# Patient Record
Sex: FEMALE | Race: WHITE | Employment: FULL TIME | URBAN - METROPOLITAN AREA
[De-identification: names, ages, dates, MRNs, and addresses within clinical notes are randomized per-mention and may not be internally consistent; named-entity substitution may affect disease eponyms.]

---

## 2017-07-17 LAB — NORMAL RANGE: NORMAL

## 2018-07-17 ENCOUNTER — OFFICE VISIT (OUTPATIENT)
Dept: DERMATOLOGY | Facility: CLINIC | Age: 23
End: 2018-07-17
Payer: COMMERCIAL

## 2018-07-17 DIAGNOSIS — D22.9 MULTIPLE BENIGN NEVI: ICD-10-CM

## 2018-07-17 DIAGNOSIS — L70.0 ACNE VULGARIS: Primary | ICD-10-CM

## 2018-07-17 RX ORDER — INFLUENZA VIRUS VACCINE 15; 15; 15; 15 UG/.5ML; UG/.5ML; UG/.5ML; UG/.5ML
SUSPENSION INTRAMUSCULAR
Refills: 0 | COMMUNITY
Start: 2017-09-10 | End: 2018-10-17

## 2018-07-17 RX ORDER — METHYLPHENIDATE HYDROCHLORIDE 54 MG/1
54 TABLET ORAL EVERY MORNING
Refills: 0 | COMMUNITY
Start: 2017-12-08 | End: 2018-10-17

## 2018-07-17 RX ORDER — BUPROPION HYDROCHLORIDE 150 MG/1
TABLET ORAL
Refills: 0 | COMMUNITY
Start: 2018-05-11 | End: 2018-10-17

## 2018-07-17 RX ORDER — FLUOCINONIDE 0.5 MG/G
OINTMENT TOPICAL
Refills: 0 | COMMUNITY
Start: 2017-10-03 | End: 2018-10-17

## 2018-07-17 RX ORDER — DOXYCYCLINE 100 MG/1
100 CAPSULE ORAL DAILY
Qty: 90 CAPSULE | Refills: 0 | Status: SHIPPED | OUTPATIENT
Start: 2018-07-17 | End: 2018-10-17

## 2018-07-17 RX ORDER — ADAPALENE AND BENZOYL PEROXIDE GEL, 0.1%/2.5% 1; 25 MG/G; MG/G
GEL TOPICAL
Refills: 6 | COMMUNITY
Start: 2018-01-29 | End: 2018-09-08

## 2018-07-17 RX ORDER — NORELGESTROMIN AND ETHINYL ESTRADIOL 150; 35 UG/D; UG/D
PATCH TRANSDERMAL
Refills: 8 | COMMUNITY
Start: 2018-06-21 | End: 2019-04-18

## 2018-07-17 RX ORDER — CEPHALEXIN 500 MG/1
CAPSULE ORAL
Refills: 4 | COMMUNITY
Start: 2017-10-01 | End: 2018-10-17

## 2018-07-17 ASSESSMENT — PAIN SCALES - GENERAL: PAINLEVEL: NO PAIN (0)

## 2018-07-17 NOTE — LETTER
7/17/2018       RE: Hanna Berg  [address]  [address]  [facility] [address]     Dear Colleague,    Thank you for referring your patient, Hanna Berg, to the [facility] DERMATOLOGY at [facility]. Please see a copy of my visit note below.    [facility] Dermatology Note      Dermatology Problem List:  1.acne vulgaris - current: doxycycline 100mg po QD, epiduo   -Previous Tx: Bactrim, Accutane ~2012, various Rx topicals    Encounter Date: Jul 17, 2018    CC:  Chief Complaint   Patient presents with     Derm Problem     Hanna is here today to discuss acne treatments.      Skin Check     Hanna would also like a skin check- has one area of concern.          History of Present Illness:  Ms. Hanna Berg is a 23 year old female who presents for a skin check and a concern of acne. The patient is experiencing a good day, as in very few acne papules on the face. However, she continues to have moderate acne on her face since high school when she had a course of Accutane. She remembers that the Accutane made her very dry. Since the Accutane, she has been treated with doxy which worked very well or her. She is currently on Bactrim, but is not taking this medication regularly, she does not like the size of the pills. She says that antibiotics help with random flare-ups. She is also using Epiduo, Neutrogena facial cleanser at night and a sal acid wash in the morning. She is on birth control patch. She does not get periods because of this. She reports no acne with hormonal changes. She wants to try another antibiotic today. She is less inclined to start Accutane again due to the dryness, but understands she cannot stay on antibiotics for long periods of time. She has a hx of depression and suicidal ideation.      She says she has a mole of concern on her back. She burns easily but uses sunscreen regularly. No hx of skin cancer and no fhx of melanoma. The patient  denies painful, itching, tingling or bleeding lesions unless otherwise noted. She is otherwise doing well today.      Past Medical History:   Patient Active Problem List   Diagnosis     Suicidal ideation     Past Medical History:   Diagnosis Date     Depressive disorder      Scoliosis      Past Surgical History:   Procedure Laterality Date     HC TOOTH EXTRACTION W/FORCEP         Social History:  She uses sunscreen regularly.     Family History:  Brother has bad acne    Medications:  Current Outpatient Prescriptions   Medication Sig Dispense Refill     adapalene-benzoyl peroxide (EPIDUO) 0.1-2.5 % gel APPLY A PEA-SIZE AMOUNT TO THE FACE NIGHTLY AT BEDTIME AS TOLERATED.  6     buPROPion (WELLBUTRIN SR) 200 MG 12 hr tablet Take 1 tablet (200 mg) by mouth daily 60 tablet 0     buPROPion (WELLBUTRIN XL) 150 MG 24 hr tablet TAKE 1 TABLET BY MOUTH FOR 7 DAYS, THEN INCREASE TO 2 TABLETS DAILY IF WELL TOLERATED.  0     cephALEXin (KEFLEX) 500 MG capsule TAKE 1 CAPSULE BY MOUTH 2 TIMES DAILY WITH FOOD.  4     FLUARIX QUADRIVALENT 0.5 ML injection TO BE ADMINISTERED BY A PHARMACIST  0     fluocinonide (LIDEX) 0.05 % ointment APPLY TOPICALLY TO THE RASH ON EARS TWICE DAILY FOR UP TO 2 WEEKS  0     HYDROXYZINE HCL PO Take 25 mg by mouth At Bedtime       methylphenidate ER (CONCERTA) 54 MG CR tablet Take 54 mg by mouth every morning  0     norgestim-eth estrad triphasic (TRI-SPRINTEC) 0.18/0.215/0.25 MG-35 MCG TABS tablet Take 1 tablet by mouth daily       sertraline (ZOLOFT) 100 MG tablet Take 200 mg by mouth daily       Sulfamethoxazole-Trimethoprim (SULFAMETHOXAZOLE-TMP DS PO)        vortioxetine (TRINTELLIX) 20 MG tablet        XULANE 150-35 MCG/24HR patch APPLY 1 PATCH WEEKLY, USE CONTINUSOULY  8       No Known Allergies    Review of Systems:  -Constitutional: The patient is feeling generally well  -Skin: As above in HPI. No additional skin concerns.  -GI: no nausea, abdominal pain, vomiting, diarrhea or blood in the  stool    Physical exam:  Vitals: There were no vitals taken for this visit.  GEN: This is a well developed, well-nourished female in no acute distress, in a pleasant mood.    SKIN:  Total skin excluding the undergarment areas was performed. The exam included the head/face, neck, both arms, chest, back, abdomen, both legs, digits and/or nails.   -There are superifical acneiform papules with intermixed open and closed comedones on the face.   -Multiple regular brown pigmented macules and papules are identified on the back and ankle   -Santana's skin type I, approximately 100 nevi  -No other lesions of concern on areas examined.       Impression/Plan:  1. Acne vulgaris on the face. S/p accutane(isotretinoin) in high school     Start Doxycycline 100 mg QD for 3 months    Hold Bactrim     Continue using epiduo    Consider starting Accutane in the future.  Will need to consider hx of depression/suicidal ideation potentially    2. Multiple clinically benign nevi on the back and ankle    No further intervention required. Patient to report changes.     Sunscreen: Apply 20 minutes prior to going outdoors and reapply every two hours, when wet or sweating. We recommend using an SPF 30 or higher, and to use one that is water resistant.       Follow-up in 3 months, earlier for new or changing lesions.       Staff Involved:  Scribe/Staff      Scribe Disclosure:   I, Fredi Zambrano, am serving as a scribe to document services personally performed by ELDER Rain, based on data collection and the provider's statements to me.  Provider Disclosure:   The documentation recorded by the scribe accurately reflects the services I personally performed and the decisions made by me.    All risks, benefits and alternatives were discussed with patient.  Patient is in agreement and understands the assessment and plan.  All questions were answered.    Raine Rain PA-C  [facility]: Phone: [phone], Fax: [phone]                Again, thank you for allowing me to participate in the care of your patient.      Sincerely,    Raine Rain PA-C

## 2018-07-17 NOTE — LETTER
Date:July 19, 2018      Patient was self referred, no letter generated. Do not send.        [facility] Health Information

## 2018-07-17 NOTE — NURSING NOTE
Dermatology Rooming Note    Hanna Berg's goals for this visit include:   Chief Complaint   Patient presents with     Derm Problem     Hanna is here today to discuss acne treatments.      Skin Check     Hanna would also like a skin check- has one area of concern.      Zainab Dietrich MA

## 2018-07-17 NOTE — MR AVS SNAPSHOT
After Visit Summary   7/17/2018    Hanna Berg    MRN: [id number]           Patient Information     Date Of Birth          1995        Visit Information        Provider Department      7/17/2018 3:00 PM Raine Rain PA-C [facility] Dermatology        Today's Diagnoses     Acne vulgaris    -  1      Care Instructions    The ABCDEs of Melanoma    Skin cancer can develop anywhere on the skin. Ask someone for help when checking your skin, especially in hard to see places. If you notice a mole different from others, or that changes, enlarges, itches, or bleeds (even if it is small), you should see a dermatologist.                        Follow-ups after your visit        Who to contact     Please call your clinic at [phone] to:    Ask questions about your health    Make or cancel appointments    Discuss your medicines    Learn about your test results    Speak to your doctor            Additional Information About Your Visit        MyChart Information     [employer] gives you secure access to your electronic health record. If you see a primary care provider, you can also send messages to your care team and make appointments. If you have questions, please call your primary care clinic.  If you do not have a primary care provider, please call [phone] and they will assist you.      [employer] is an electronic gateway that provides easy, online access to your medical records. With [employer], you can request a clinic appointment, read your test results, renew a prescription or communicate with your care team.     To access your existing account, please contact your [facility] or call [phone] for assistance.        Care EveryWhere ID     This is your Care EveryWhere ID. This could be used by other organizations to access your [place] medical records  [id number]         Blood Pressure from Last 3 Encounters:   09/22/14 128/66    Weight from Last 3 Encounters:    09/23/14 76.7 kg (169 lb) (93 %)*     * Growth percentiles are based on [facility][note 3] 2-20 Years data.              Today, you had the following     No orders found for display         Today's Medication Changes          These changes are accurate as of 7/17/18  3:28 PM.  If you have any questions, ask your nurse or doctor.               Start taking these medicines.        Dose/Directions    doxycycline monohydrate 100 MG capsule   Used for:  Acne vulgaris   Started by:  Raine Rain PA-C        Dose:  100 mg   Take 1 capsule (100 mg) by mouth daily   Quantity:  90 capsule   Refills:  0            Where to get your medicines      These medications were sent to [facility] [id number] [facility] - [place], [place] - [address] [place] [address]  [address] [facility] [address]     Phone:  [phone]     doxycycline monohydrate 100 MG capsule                Primary Care Provider Fax #    Physician No Ref-Primary [phone]       No address on file        Equal Access to Services     [facility] : Hadii aad ku hadasho Soisrael, waaxda luqadaha, qaybta kaalmada adeegyada, robert king . So [facility] [phone].    ATENCIÓN: Si habla español, tiene a dailey disposición servicios gratuitos de asistencia lingüística. Andreas al [phone].    We comply with applicable federal civil rights laws and [place] laws. We do not discriminate on the basis of race, color, national origin, age, disability, sex, sexual orientation, or gender identity.            Thank you!     Thank you for choosing [facility] DERMATOLOGY  for your care. Our goal is always to provide you with excellent care. Hearing back from our patients is one way we can continue to improve our services. Please take a few minutes to complete the written survey that you may receive in the mail after your visit with us. Thank you!             Your Updated Medication List - Protect others around you: Learn how to safely use, store and throw away your  medicines at www.disposemymeds.org.          This list is accurate as of 7/17/18  3:28 PM.  Always use your most recent med list.                   Brand Name Dispense Instructions for use Diagnosis    adapalene-benzoyl peroxide 0.1-2.5 % gel    EPIDUO     APPLY A PEA-SIZE AMOUNT TO THE FACE NIGHTLY AT BEDTIME AS TOLERATED.        * buPROPion 200 MG 12 hr tablet    WELLBUTRIN SR    60 tablet    Take 1 tablet (200 mg) by mouth daily    Major depressive disorder       * buPROPion 150 MG 24 hr tablet    WELLBUTRIN XL     TAKE 1 TABLET BY MOUTH FOR 7 DAYS, THEN INCREASE TO 2 TABLETS DAILY IF WELL TOLERATED.        cephALEXin 500 MG capsule    KEFLEX     TAKE 1 CAPSULE BY MOUTH 2 TIMES DAILY WITH FOOD.        doxycycline monohydrate 100 MG capsule     90 capsule    Take 1 capsule (100 mg) by mouth daily    Acne vulgaris       FLUARIX QUADRIVALENT 0.5 ML injection   Generic drug:  influenza quadrivalent (PF) vacc age 3 yrs and older      TO BE ADMINISTERED BY A PHARMACIST        fluocinonide 0.05 % ointment    LIDEX     APPLY TOPICALLY TO THE RASH ON EARS TWICE DAILY FOR UP TO 2 WEEKS        HYDROXYZINE HCL PO      Take 25 mg by mouth At Bedtime        methylphenidate ER 54 MG CR tablet    CONCERTA     Take 54 mg by mouth every morning        sertraline 100 MG tablet    ZOLOFT     Take 200 mg by mouth daily        SULFAMETHOXAZOLE-TMP DS PO           TRI-SPRINTEC 0.18/0.215/0.25 MG-35 MCG per tablet   Generic drug:  norgestim-eth estrad triphasic      Take 1 tablet by mouth daily        TRINTELLIX 20 MG tablet   Generic drug:  vortioxetine           XULANE 150-35 MCG/24HR patch   Generic drug:  norelgestromin-ethinyl estradiol      APPLY 1 PATCH WEEKLY, USE CONTINUSOULY        * Notice:  This list has 2 medication(s) that are the same as other medications prescribed for you. Read the directions carefully, and ask your doctor or other care provider to review them with you.

## 2018-07-17 NOTE — PROGRESS NOTES
[facility] Dermatology Note      Dermatology Problem List:  1.acne vulgaris - current: doxycycline 100mg po QD, epiduo   -Previous Tx: Bactrim, Accutane ~2012, various Rx topicals    Encounter Date: Jul 17, 2018    CC:  Chief Complaint   Patient presents with     Derm Problem     Hanna is here today to discuss acne treatments.      Skin Check     Hanna would also like a skin check- has one area of concern.          History of Present Illness:  Ms. Hanna Berg is a 23 year old female who presents for a skin check and a concern of acne. The patient is experiencing a good day, as in very few acne papules on the face. However, she continues to have moderate acne on her face since high school when she had a course of Accutane. She remembers that the Accutane made her very dry. Since the Accutane, she has been treated with doxy which worked very well or her. She is currently on Bactrim, but is not taking this medication regularly, she does not like the size of the pills. She says that antibiotics help with random flare-ups. She is also using Epiduo, Neutrogena facial cleanser at night and a sal acid wash in the morning. She is on birth control patch. She does not get periods because of this. She reports no acne with hormonal changes. She wants to try another antibiotic today. She is less inclined to start Accutane again due to the dryness, but understands she cannot stay on antibiotics for long periods of time. She has a hx of depression and suicidal ideation.      She says she has a mole of concern on her back. She burns easily but uses sunscreen regularly. No hx of skin cancer and no fhx of melanoma. The patient denies painful, itching, tingling or bleeding lesions unless otherwise noted. She is otherwise doing well today.      Past Medical History:   Patient Active Problem List   Diagnosis     Suicidal ideation     Past Medical History:   Diagnosis Date     Depressive disorder      Scoliosis       Past Surgical History:   Procedure Laterality Date     HC TOOTH EXTRACTION W/FORCEP         Social History:  She uses sunscreen regularly.     Family History:  Brother has bad acne    Medications:  Current Outpatient Prescriptions   Medication Sig Dispense Refill     adapalene-benzoyl peroxide (EPIDUO) 0.1-2.5 % gel APPLY A PEA-SIZE AMOUNT TO THE FACE NIGHTLY AT BEDTIME AS TOLERATED.  6     buPROPion (WELLBUTRIN SR) 200 MG 12 hr tablet Take 1 tablet (200 mg) by mouth daily 60 tablet 0     buPROPion (WELLBUTRIN XL) 150 MG 24 hr tablet TAKE 1 TABLET BY MOUTH FOR 7 DAYS, THEN INCREASE TO 2 TABLETS DAILY IF WELL TOLERATED.  0     cephALEXin (KEFLEX) 500 MG capsule TAKE 1 CAPSULE BY MOUTH 2 TIMES DAILY WITH FOOD.  4     FLUARIX QUADRIVALENT 0.5 ML injection TO BE ADMINISTERED BY A PHARMACIST  0     fluocinonide (LIDEX) 0.05 % ointment APPLY TOPICALLY TO THE RASH ON EARS TWICE DAILY FOR UP TO 2 WEEKS  0     HYDROXYZINE HCL PO Take 25 mg by mouth At Bedtime       methylphenidate ER (CONCERTA) 54 MG CR tablet Take 54 mg by mouth every morning  0     norgestim-eth estrad triphasic (TRI-SPRINTEC) 0.18/0.215/0.25 MG-35 MCG TABS tablet Take 1 tablet by mouth daily       sertraline (ZOLOFT) 100 MG tablet Take 200 mg by mouth daily       Sulfamethoxazole-Trimethoprim (SULFAMETHOXAZOLE-TMP DS PO)        vortioxetine (TRINTELLIX) 20 MG tablet        XULANE 150-35 MCG/24HR patch APPLY 1 PATCH WEEKLY, USE CONTINUSOULY  8       No Known Allergies    Review of Systems:  -Constitutional: The patient is feeling generally well  -Skin: As above in HPI. No additional skin concerns.  -GI: no nausea, abdominal pain, vomiting, diarrhea or blood in the stool    Physical exam:  Vitals: There were no vitals taken for this visit.  GEN: This is a well developed, well-nourished female in no acute distress, in a pleasant mood.    SKIN:  Total skin excluding the undergarment areas was performed. The exam included the head/face, neck, both arms,  chest, back, abdomen, both legs, digits and/or nails.   -There are superifical acneiform papules with intermixed open and closed comedones on the face.   -Multiple regular brown pigmented macules and papules are identified on the back and ankle   -Santana's skin type I, approximately 100 nevi  -No other lesions of concern on areas examined.       Impression/Plan:  1. Acne vulgaris on the face. S/p accutane(isotretinoin) in high school     Start Doxycycline 100 mg QD for 3 months    Hold Bactrim     Continue using epiduo    Consider starting Accutane in the future.  Will need to consider hx of depression/suicidal ideation potentially    2. Multiple clinically benign nevi on the back and ankle    No further intervention required. Patient to report changes.     Sunscreen: Apply 20 minutes prior to going outdoors and reapply every two hours, when wet or sweating. We recommend using an SPF 30 or higher, and to use one that is water resistant.       Follow-up in 3 months, earlier for new or changing lesions.       Staff Involved:  Scribe/Staff      Scribe Disclosure:   I, Fredi Zambrano, am serving as a scribe to document services personally performed by ELDER Rain, based on data collection and the provider's statements to me.  Provider Disclosure:   The documentation recorded by the scribe accurately reflects the services I personally performed and the decisions made by me.    All risks, benefits and alternatives were discussed with patient.  Patient is in agreement and understands the assessment and plan.  All questions were answered.    Raine Rain PA-C  [facility]: Phone: [phone], Fax: [phone]

## 2018-07-24 ENCOUNTER — HEALTH MAINTENANCE LETTER (OUTPATIENT)
Age: 23
End: 2018-07-24

## 2018-09-08 DIAGNOSIS — L70.0 ACNE VULGARIS: Primary | ICD-10-CM

## 2018-09-08 RX ORDER — ADAPALENE AND BENZOYL PEROXIDE GEL, 0.1%/2.5% 1; 25 MG/G; MG/G
GEL TOPICAL
Qty: 45 G | Refills: 6 | Status: SHIPPED | OUTPATIENT
Start: 2018-09-08 | End: 2019-04-18

## 2018-10-17 ENCOUNTER — OFFICE VISIT (OUTPATIENT)
Dept: DERMATOLOGY | Facility: CLINIC | Age: 23
End: 2018-10-17
Payer: COMMERCIAL

## 2018-10-17 DIAGNOSIS — B07.0 PLANTAR WART, RIGHT FOOT: Primary | ICD-10-CM

## 2018-10-17 DIAGNOSIS — L70.0 ACNE VULGARIS: ICD-10-CM

## 2018-10-17 RX ORDER — DOXYCYCLINE 100 MG/1
100 CAPSULE ORAL DAILY
Qty: 90 CAPSULE | Refills: 0 | Status: SHIPPED | OUTPATIENT
Start: 2018-10-17 | End: 2019-01-02

## 2018-10-17 ASSESSMENT — PAIN SCALES - GENERAL: PAINLEVEL: NO PAIN (0)

## 2018-10-17 NOTE — NURSING NOTE
"[facility]:  PHQ-9 Screening Note  SITUATION/BACKGROUND                                                    Hanna Berg is a 23 year old female who completed the PHQ-9 assessment for depression and Score is >9.    Onset of symptoms: Consitent  Trigger: None  Recent related events: None  Prior history of suicide attempt or self harm: Yes  Risk Factors: previous suicide attempts  History of depression or mental illness: Yes  Medications reviewed: Yes     ASSESSMENT      A. Are any of the following present?      Suicidal thoughts with a plan and means to carry out the plan?    Intent to harm others    Altered mental status: confusion, delusional, psychotic NO - got to B   B. Are any of the following present?      Suicidal thoughts without a plan or means to carry out the plan    New onset of delusional ideas    Past inpatient admission for depression    New onset and recent change or addition of new medication NO - go to C   C. Are any of the following present?      Previous suicide attempts    Depression interfering with ability to work or function    Loss of appetite and eating poorly    Abrupt cessation of drugs (OTC or RX), alcohol or caffeine    Drug or alcohol abuse YES -  Provide patient with crisis resources.     Place referral to behavioral health team for \"regular\" follow-up   D. Are several of the following present?      Difficulty concentrating    Difficulty sleeping    Reduced interest in sexual activity or impotency    Irregular or absent menstruation    No interest in activity    Change in interpersonal relationships    Increased use/abuse of alcohol or drugs    Pregnant or recent child birth    Recent major life change    History of depression YES -  Follow-up with PCP for appointment and follow home care instructions.    Place referral to behavioral health team for \"regular\" follow-up.         PLAN      Home Care Instructions:   If currently in counseling, call counselor for " appointment    Report the following to your PCP:   Suicidal thoughts without a plan or means to carry out the plan    Seek emergency care immediately if any of the following occur:   Suicidal thoughts and plan and means to carry out the plan    BEHAVIORAL HEALTH TEAMS      [facility] - Behavioral Health Team    [facility] Pager: [phone]    [place]  - Behavioral Health Team    Pager number: [phone]    Referral to Behavioral Health   [facility]OW[facility] [04674}    RESOURCES      - 24/7 Crisis Hotlines: National Suicide Prevention Hotline  266-086-KPZQ (5504)    Vivi Lane, RN        Copyright 2016 [employer]

## 2018-10-17 NOTE — PROGRESS NOTES
"[facility] Dermatology Note      Dermatology Problem List:  1.acne vulgaris - current: doxycycline 100mg po QD, epiduo   -Previous Tx: Bactrim, Accutane ~2012, various Rx topicals  2. Verruca plantaris, plantar surface of right foot  - s/p paring & cryo- 10/17/18    Encounter Date: Oct 17, 2018    CC:  Chief Complaint   Patient presents with     Acne     Hanna is here for an acne follow up and notes \"steady and no flare ups\"     History of Present Illness:  Ms. Hanna Berg is a 23 year old female who presents today for an acne follow up. The patient was last seen in the dermatology clinic on 07/17/18 during which she started doxycycline 100 mg every day for 3 months regarding her acne vulgaris.     Today she reports that her skin is stable and under control. She has not had any flares since starting the doxycycline. She has been experiencing few acne bumps here and there but nothing major. She notes her acne spots are smaller and resolve quicker than her previous acne eruptions. She has been using the epiduo nightly without any excess dryness or irritation. She is happy with her current acne regimen. She would like to stay on the doxycycline, rather than going on Accutane again. When she took Accutane in high school she experienced multiple negative side effects and said it was very hard for her.     She also reports that she has warts on her feet. She has tried using OTC treatments without much improvement in the lesions. She is interested in clinical treatment. She has had the wart for a long time. She denies any additional warts on her other foot or hands.    She denies GI upset, nausea, stool changes, increase in yeast infections, photosensitivity. She has not noticed a change in her periods or change in her acne around her menstrual cycle. Otherwise the patient reports no additional painful, bleeding, nonhealing or pruritic lesions and denies any new or changing moles.    Past Medical " History:   Patient Active Problem List   Diagnosis     Suicidal ideation     Past Medical History:   Diagnosis Date     Depressive disorder      Scoliosis      Past Surgical History:   Procedure Laterality Date     HC TOOTH EXTRACTION W/FORCEP         Social History:  She uses sunscreen regularly.     Family History:  Brother has bad acne    Medications:  Current Outpatient Prescriptions   Medication Sig Dispense Refill     adapalene-benzoyl peroxide (EPIDUO) 0.1-2.5 % gel APPLY A PEA-SIZE AMOUNT TO THE FACE NIGHTLY AT BEDTIME AS TOLERATED. 45 g 6     doxycycline monohydrate 100 MG capsule Take 1 capsule (100 mg) by mouth daily 90 capsule 0     SELEGILINE HCL PO Take 30 mg by mouth daily       XULANE 150-35 MCG/24HR patch APPLY 1 PATCH WEEKLY, USE CONTINUSOULY  8     buPROPion (WELLBUTRIN SR) 200 MG 12 hr tablet Take 1 tablet (200 mg) by mouth daily (Patient not taking: Reported on 10/17/2018) 60 tablet 0     buPROPion (WELLBUTRIN XL) 150 MG 24 hr tablet TAKE 1 TABLET BY MOUTH FOR 7 DAYS, THEN INCREASE TO 2 TABLETS DAILY IF WELL TOLERATED.  0     cephALEXin (KEFLEX) 500 MG capsule TAKE 1 CAPSULE BY MOUTH 2 TIMES DAILY WITH FOOD.  4     FLUARIX QUADRIVALENT 0.5 ML injection TO BE ADMINISTERED BY A PHARMACIST  0     fluocinonide (LIDEX) 0.05 % ointment APPLY TOPICALLY TO THE RASH ON EARS TWICE DAILY FOR UP TO 2 WEEKS  0     HYDROXYZINE HCL PO Take 25 mg by mouth At Bedtime       methylphenidate ER (CONCERTA) 54 MG CR tablet Take 54 mg by mouth every morning  0     norgestim-eth estrad triphasic (TRI-SPRINTEC) 0.18/0.215/0.25 MG-35 MCG TABS tablet Take 1 tablet by mouth daily       sertraline (ZOLOFT) 100 MG tablet Take 200 mg by mouth daily       Sulfamethoxazole-Trimethoprim (SULFAMETHOXAZOLE-TMP DS PO)        vortioxetine (TRINTELLIX) 20 MG tablet          No Known Allergies    Review of Systems:  -Constitutional: The patient is feeling generally well  -Skin: As above in HPI. No additional skin concerns.  -GI: no  nausea, abdominal pain, vomiting, diarrhea or blood in the stool    Physical exam:  Vitals: There were no vitals taken for this visit.  GEN: This is a well developed, well-nourished female in no acute distress, in a pleasant mood.    SKIN:  A focused examination of the face and right foot were performed. Significant for:     -There are verrucous papules with thrombosed capillaries interrupting dermatoglyphics coalescing into a mosaic on the plantar surface of the right 4th metatarsal, as well as scattered to the surrounding plantar surface of the right foot.  -Rare comedone to the face.   -No other lesions of concern on areas examined.       Impression/Plan:  1. Acne vulgaris on the face, well controlled on current therapy. S/p accutane(isotretinoin) in high school     Continue Doxycycline 100 mg every day, attempt to taper at f/u.     Continue using epiduo nightly    Consider starting Spironolactone in the future. Discussed side effects and benefits as an alternative to an oral antibiotic.     2. Verruca plantaris    Discussed clinical treatment options such as: oral medications, cryotherapy and candida injections    Lesions pared with Dermablade piror to cryotherapy treatments  Cryotherapy procedure note: After verbal consent and discussion of risks and benefits including but no limited to dyspigmentation/scar, blister, and pain, 12 were treated with 1-2mm freeze border for 2 cycles with liquid nitrogen. Post cryotherapy instructions were provided.  Discussed consistent clinical treatment is key to complete removal of the lesion   Recommend paring the lesion down at home with a pumice stone after lesions have healed from today's cryotherapy  OK to continue to use OTC salicylic acid treatments after lesions have healed from today's cryotherapy.         Follow-up in 1 month, earlier for new or changing lesions.       Staff Involved:  Scribe/Staff    Scribe Disclosure:   Helen CORONADO, am serving as a scribe to  document services personally performed by Vidya De Luna PA-C, based on data collection and the provider's statements to me.    Provider Disclosure:   The documentation recorded by the scribe accurately reflects the services I personally performed and the decisions made by me.    All risks, benefits and alternatives were discussed with patient.  Patient is in agreement and understands the assessment and plan.  All questions were answered.  Sun Screen Education was given.   Return to Clinic in 1 month or sooner as needed.   Vidya De Luna PA-C   [facility]

## 2018-10-17 NOTE — LETTER
"10/17/2018       RE: Hanna Berg  [address]  [address]  [facility] [address]     Dear Colleague,    Thank you for referring your patient, Hanna Berg, to the [facility] DERMATOLOGY at [facility]. Please see a copy of my visit note below.    [facility] Dermatology Note      Dermatology Problem List:  1.acne vulgaris - current: doxycycline 100mg po QD, epiduo   -Previous Tx: Bactrim, Accutane ~2012, various Rx topicals  2. Verruca plantaris, plantar surface of right foot  - s/p paring & cryo- 10/17/18    Encounter Date: Oct 17, 2018    CC:  Chief Complaint   Patient presents with     Acne     Hanna is here for an acne follow up and notes \"steady and no flare ups\"     History of Present Illness:  Ms. Hanna Berg is a 23 year old female who presents today for an acne follow up. The patient was last seen in the dermatology clinic on 07/17/18 during which she started doxycycline 100 mg every day for 3 months regarding her acne vulgaris.     Today she reports that her skin is stable and under control. She has not had any flares since starting the doxycycline. She has been experiencing few acne bumps here and there but nothing major. She notes her acne spots are smaller and resolve quicker than her previous acne eruptions. She has been using the epiduo nightly without any excess dryness or irritation. She is happy with her current acne regimen. She would like to stay on the doxycycline, rather than going on Accutane again. When she took Accutane in high school she experienced multiple negative side effects and said it was very hard for her.     She also reports that she has warts on her feet. She has tried using OTC treatments without much improvement in the lesions. She is interested in clinical treatment. She has had the wart for a long time. She denies any additional warts on her other foot or hands.    She denies GI upset, nausea, stool changes, increase " in yeast infections, photosensitivity. She has not noticed a change in her periods or change in her acne around her menstrual cycle. Otherwise the patient reports no additional painful, bleeding, nonhealing or pruritic lesions and denies any new or changing moles.    Past Medical History:   Patient Active Problem List   Diagnosis     Suicidal ideation     Past Medical History:   Diagnosis Date     Depressive disorder      Scoliosis      Past Surgical History:   Procedure Laterality Date     HC TOOTH EXTRACTION W/FORCEP         Social History:  She uses sunscreen regularly.     Family History:  Brother has bad acne    Medications:  Current Outpatient Prescriptions   Medication Sig Dispense Refill     adapalene-benzoyl peroxide (EPIDUO) 0.1-2.5 % gel APPLY A PEA-SIZE AMOUNT TO THE FACE NIGHTLY AT BEDTIME AS TOLERATED. 45 g 6     doxycycline monohydrate 100 MG capsule Take 1 capsule (100 mg) by mouth daily 90 capsule 0     SELEGILINE HCL PO Take 30 mg by mouth daily       XULANE 150-35 MCG/24HR patch APPLY 1 PATCH WEEKLY, USE CONTINUSOULY  8     buPROPion (WELLBUTRIN SR) 200 MG 12 hr tablet Take 1 tablet (200 mg) by mouth daily (Patient not taking: Reported on 10/17/2018) 60 tablet 0     buPROPion (WELLBUTRIN XL) 150 MG 24 hr tablet TAKE 1 TABLET BY MOUTH FOR 7 DAYS, THEN INCREASE TO 2 TABLETS DAILY IF WELL TOLERATED.  0     cephALEXin (KEFLEX) 500 MG capsule TAKE 1 CAPSULE BY MOUTH 2 TIMES DAILY WITH FOOD.  4     FLUARIX QUADRIVALENT 0.5 ML injection TO BE ADMINISTERED BY A PHARMACIST  0     fluocinonide (LIDEX) 0.05 % ointment APPLY TOPICALLY TO THE RASH ON EARS TWICE DAILY FOR UP TO 2 WEEKS  0     HYDROXYZINE HCL PO Take 25 mg by mouth At Bedtime       methylphenidate ER (CONCERTA) 54 MG CR tablet Take 54 mg by mouth every morning  0     norgestim-eth estrad triphasic (TRI-SPRINTEC) 0.18/0.215/0.25 MG-35 MCG TABS tablet Take 1 tablet by mouth daily       sertraline (ZOLOFT) 100 MG tablet Take 200 mg by mouth daily        Sulfamethoxazole-Trimethoprim (SULFAMETHOXAZOLE-TMP DS PO)        vortioxetine (TRINTELLIX) 20 MG tablet          No Known Allergies    Review of Systems:  -Constitutional: The patient is feeling generally well  -Skin: As above in HPI. No additional skin concerns.  -GI: no nausea, abdominal pain, vomiting, diarrhea or blood in the stool    Physical exam:  Vitals: There were no vitals taken for this visit.  GEN: This is a well developed, well-nourished female in no acute distress, in a pleasant mood.    SKIN:  A focused examination of the face and right foot were performed. Significant for:     -There are verrucous papules with thrombosed capillaries interrupting dermatoglyphics coalescing into a mosaic on the plantar surface of the right 4th metatarsal, as well as scattered to the surrounding plantar surface of the right foot.  -Rare comedone to the face.   -No other lesions of concern on areas examined.       Impression/Plan:  1. Acne vulgaris on the face, well controlled on current therapy. S/p accutane(isotretinoin) in high school     Continue Doxycycline 100 mg every day, attempt to taper at f/u.     Continue using epiduo nightly    Consider starting Spironolactone in the future. Discussed side effects and benefits as an alternative to an oral antibiotic.     2. Verruca plantaris    Discussed clinical treatment options such as: oral medications, cryotherapy and candida injections    Lesions pared with Dermablade piror to cryotherapy treatments  Cryotherapy procedure note: After verbal consent and discussion of risks and benefits including but no limited to dyspigmentation/scar, blister, and pain, 12 were treated with 1-2mm freeze border for 2 cycles with liquid nitrogen. Post cryotherapy instructions were provided.  Discussed consistent clinical treatment is key to complete removal of the lesion   Recommend paring the lesion down at home with a pumice stone after lesions have healed from today's  cryotherapy  OK to continue to use OTC salicylic acid treatments after lesions have healed from today's cryotherapy.         Follow-up in 1 month, earlier for new or changing lesions.       Staff Involved:  Scribe/Staff    Scribe Disclosure:   I, Helen Hoff, am serving as a scribe to document services personally performed by Vidya De Luna PA-C, based on data collection and the provider's statements to me.    Provider Disclosure:   The documentation recorded by the scribe accurately reflects the services I personally performed and the decisions made by me.    All risks, benefits and alternatives were discussed with patient.  Patient is in agreement and understands the assessment and plan.  All questions were answered.  Sun Screen Education was given.   Return to Clinic in 1 month or sooner as needed.   Vidya De Luna PA-C   [facility] Dermat[facility]               Again, thank you for allowing me to participate in the care of your patient.      Sincerely,    Vidya De Luna PA-C

## 2018-10-17 NOTE — LETTER
Date:October 19, 2018      Patient was self referred, no letter generated. Do not send.        [facility] Information

## 2018-10-17 NOTE — NURSING NOTE
"Chief Complaint   Patient presents with     Acne     Hanna is here for an acne follow up and notes \"steady and no flare ups\"     Maximino Patton, LPN    "

## 2018-10-17 NOTE — PATIENT INSTRUCTIONS
Cryotherapy    What is it?    Use of a very cold liquid, such as liquid nitrogen, to freeze and destroy abnormal skin cells that need to be removed    What should I expect?    Tenderness and redness    A small blister that might grow and fill with dark purple blood. There may be crusting.    More than one treatment may be needed if the lesions do not go away.    How do I care for the treated area?    Gently wash the area with your hands when bathing.    Use a thin layer of Vaseline to help with healing. You may use a Band-Aid.     The area should heal within 7-10 days and may leave behind a pink or lighter color.     Do not use an antibiotic or Neosporin ointment.     You may take acetaminophen (Tylenol) for pain.     Call your Doctor if you have:    Severe pain    Signs of infection (warmth, redness, cloudy yellow drainage, and or a bad smell)    Questions or concerns    Who should I call with questions?       [facility]: [phone]       [facility]: [phone]       For urgent needs outside of business hours call the [facility] at [phone]        and ask for the dermatology resident on call

## 2018-10-17 NOTE — MR AVS SNAPSHOT
After Visit Summary   10/17/2018    Hanna Berg    MRN: [id number]           Patient Information     Date Of Birth          1995        Visit Information        Provider Department      10/17/2018 1:30 PM Vidya De Luna PA-C [facility] Dermatology        Today's Diagnoses     Acne vulgaris          Care Instructions    Cryotherapy    What is it?    Use of a very cold liquid, such as liquid nitrogen, to freeze and destroy abnormal skin cells that need to be removed    What should I expect?    Tenderness and redness    A small blister that might grow and fill with dark purple blood. There may be crusting.    More than one treatment may be needed if the lesions do not go away.    How do I care for the treated area?    Gently wash the area with your hands when bathing.    Use a thin layer of Vaseline to help with healing. You may use a Band-Aid.     The area should heal within 7-10 days and may leave behind a pink or lighter color.     Do not use an antibiotic or Neosporin ointment.     You may take acetaminophen (Tylenol) for pain.     Call your Doctor if you have:    Severe pain    Signs of infection (warmth, redness, cloudy yellow drainage, and or a bad smell)    Questions or concerns    Who should I call with questions?       [facility]: [phone]       [facility]: [phone]       For urgent needs outside of business hours call the [facility] at [phone]        and ask for the dermatology resident on call            Follow-ups after your visit        Who to contact     Please call your clinic at [phone] to:    Ask questions about your health    Make or cancel appointments    Discuss your medicines    Learn about your test results    Speak to your doctor            Additional Information About Your Visit        MyChart Information     [employer] gives you secure access to your electronic health record. If  you see a primary care provider, you can also send messages to your care team and make appointments. If you have questions, please call your primary care clinic.  If you do not have a primary care provider, please call [phone] and they will assist you.      [employer] is an electronic gateway that provides easy, online access to your medical records. With [employer], you can request a clinic appointment, read your test results, renew a prescription or communicate with your care team.     To access your existing account, please contact your [facility] or call [phone] for assistance.        Care EveryWhere ID     This is your Care EveryWhere ID. This could be used by other organizations to access your [place] medical records  [id number]         Blood Pressure from Last 3 Encounters:   09/22/14 128/66    Weight from Last 3 Encounters:   09/23/14 76.7 kg (169 lb) (93 %)*     * Growth percentiles are based on [facility] 2-20 Years data.              We Performed the Following     Pap Diagnostic Thin Prep          Where to get your medicines      These medications were sent to [place] [address] [place], [place] - [address]  [address] [address]     Phone:  [phone]     doxycycline monohydrate 100 MG capsule          Primary Care Provider Fax #    Physician No Ref-Primary [phone]       No address on file        Equal Access to Services     HENRY WHALEY : Byron jimenez Soisrael, waaxda luqadaha, qaybta kaalmada adeegyada, robert king . So [facility] [phone].    ATENCIÓN: Si habla español, tiene a dailey disposición servicios gratuitos de asistencia lingüística. Llame al [phone].    We comply with applicable federal civil rights laws and [place] laws. We do not discriminate on the basis of race, color, national origin, age, disability, sex, sexual orientation, or gender identity.            Thank you!      Thank you for choosing [facility] DERMATOLOGY  for your care. Our goal is always to provide you with excellent care. Hearing back from our patients is one way we can continue to improve our services. Please take a few minutes to complete the written survey that you may receive in the mail after your visit with us. Thank you!             Your Updated Medication List - Protect others around you: Learn how to safely use, store and throw away your medicines at www.disposemymeds.org.          This list is accurate as of 10/17/18  2:23 PM.  Always use your most recent med list.                   Brand Name Dispense Instructions for use Diagnosis    adapalene-benzoyl peroxide 0.1-2.5 % gel    EPIDUO    45 g    APPLY A PEA-SIZE AMOUNT TO THE FACE NIGHTLY AT BEDTIME AS TOLERATED.    Acne vulgaris       doxycycline monohydrate 100 MG capsule     90 capsule    Take 1 capsule (100 mg) by mouth daily    Acne vulgaris       SELEGILINE HCL PO      Take 30 mg by mouth daily        XULANE 150-35 MCG/24HR patch   Generic drug:  norelgestromin-ethinyl estradiol      APPLY 1 PATCH WEEKLY, USE CONTINUSOULY

## 2018-10-17 NOTE — NURSING NOTE
Patient denied referral from [facility] as she is under the supervision of a phycologist and sees a therapist. She has an upcoming appointments with the therapist and phycologist.     Vivi Lane RN

## 2018-10-18 ASSESSMENT — PATIENT HEALTH QUESTIONNAIRE - PHQ9: SUM OF ALL RESPONSES TO PHQ QUESTIONS 1-9: 21

## 2018-11-14 ENCOUNTER — OFFICE VISIT (OUTPATIENT)
Dept: DERMATOLOGY | Facility: CLINIC | Age: 23
End: 2018-11-14
Payer: COMMERCIAL

## 2018-11-14 DIAGNOSIS — B07.0 PLANTAR WART, RIGHT FOOT: Primary | ICD-10-CM

## 2018-11-14 ASSESSMENT — PAIN SCALES - GENERAL: PAINLEVEL: NO PAIN (0)

## 2018-11-14 NOTE — PROGRESS NOTES
[facility] Dermatology Note      Dermatology Problem List:  1.acne vulgaris - current: doxycycline 100mg po QD, epiduo   -Previous Tx: Bactrim, Accutane ~, various Rx topicals  2. Verruca plantaris, plantar surface of right foot  - s/p paring & cryo- 10/17/18, 2018    Encounter Date: 2018    CC:  Chief Complaint   Patient presents with     Derm Problem     Hanna is here today for a wart follow up- notes improvment.          History of Present Illness:  Ms. Hanna Berg is a 23 year old female who presents as a follow-up for wart. The patient was last seen 10/17/2018 when cryo was administered. At today's visit the patient states that her acne is much better and her wart has improved. She states that she has been using the Compound W at home. The patient denies painful, itching, tingling or bleeding lesions unless otherwise noted.      Past Medical History:   Patient Active Problem List   Diagnosis     Suicidal ideation     Past Medical History:   Diagnosis Date     Depressive disorder      Scoliosis      Past Surgical History:   Procedure Laterality Date     HC TOOTH EXTRACTION W/FORCEP         Social History:   reports that she has never smoked. She has never used smokeless tobacco. She reports that she does not drink alcohol or use illicit drugs.    Family History:  Family History   Problem Relation Age of Onset     Endocrine Disease Mother      hypothyroidism     C.A.D. Paternal Grandfather       MI     Prostate Cancer Maternal Grandfather      Depression Maternal Grandmother      Lipids Maternal Grandmother      Melanoma No family hx of      Skin Cancer No family hx of        Medications:  Current Outpatient Prescriptions   Medication Sig Dispense Refill     adapalene-benzoyl peroxide (EPIDUO) 0.1-2.5 % gel APPLY A PEA-SIZE AMOUNT TO THE FACE NIGHTLY AT BEDTIME AS TOLERATED. 45 g 6     doxycycline monohydrate 100 MG capsule Take 1 capsule (100 mg) by mouth daily 90  capsule 0     SELEGILINE HCL PO Take 30 mg by mouth daily       XULANE 150-35 MCG/24HR patch APPLY 1 PATCH WEEKLY, USE CONTINUSOULY  8       No Known Allergies    Review of Systems:  -Constitutional: The patient denies fatigue, fevers, chills, unintended weight loss, and night sweats.  -Skin: As above in HPI. No additional skin concerns.    Physical exam:  Vitals: There were no vitals taken for this visit.  GEN: This is a well developed, well-nourished female in no acute distress, in a pleasant mood.    SKIN: Focused examination of the face and left foot was performed.  -There are 8 verrucous papules with thrombosed capillaries interrupting dermatoglyphics on the right planter wart, some of them near the 4th metatarsal that are in a mosaic pattern.    -No other lesions of concern on areas examined.       Impression/Plan:  1. Acne vulgaris on the face, well controlled on current therap. S/p accutane (isotretinoin) in high school    Continue Doxycycline 100 mg every day, attempt to taper at f/u.     Continue using epiduo nightly    Consider starting Spironolactone in the future. Discussed side effects and benefits as an alternative to an oral antibiotic.     2. Verruca plantaris and Verruca vulgaris    Cryotherapy procedure note: After verbal consent and discussion of risks and benefits including but no limited to dyspigmentation/scar, blister, and pain, 8 was(were) treated with 1-2mm freeze border for 2 cycles with liquid nitrogen. Post cryotherapy instructions were provided.     Continue to use OTC salicylic acid treatments after lesions have healed from today's cryotherapy.    We will clinically monitor this area.    CC Dr. Jones on close of this encounter.  Follow-up in 3 weeks, earlier for new or changing lesions.       Staff Involved:    Scribe Disclosure  I, Jeremias Hartman, am serving as a scribe to document services personally performed by Raine Rain PA-C, based on data collection and the provider's  statements to me.     Provider Disclosure:   The documentation recorded by the scribe accurately reflects the services I personally performed and the decisions made by me.    All risks, benefits and alternatives were discussed with patient.  Patient is in agreement and understands the assessment and plan.  All questions were answered.    Raine Rain PA-C  [facility]: Phone: [phone], Fax: [phone]

## 2018-11-14 NOTE — LETTER
Date:November 15, 2018      Patient was self referred, no letter generated. Do not send.        [facility] Health Information

## 2018-11-14 NOTE — MR AVS SNAPSHOT
After Visit Summary   11/14/2018    Hanna Berg    MRN: [id number]           Patient Information     Date Of Birth          1995        Visit Information        Provider Department      11/14/2018 1:30 PM Raine Rain PA-C M [facility] Dermatology        Today's Diagnoses     Plantar wart, right foot    -  1       Follow-ups after your visit        Follow-up notes from your care team     Return in about 3 weeks (around 12/5/2018).      Your next 10 appointments already scheduled     Dec 05, 2018  2:00 PM CST   (Arrive by 1:45 PM)   Return Visit with JEANIE Lindquist [facility] Dermatology ([facility] [place])    [address] [address] [phone]              Who to contact     Please call your clinic at [phone] to:    Ask questions about your health    Make or cancel appointments    Discuss your medicines    Learn about your test results    Speak to your doctor            Additional Information About Your Visit        [employer] Information     [employer] gives you secure access to your electronic health record. If you see a primary care provider, you can also send messages to your care team and make appointments. If you have questions, please call your primary care clinic.  If you do not have a primary care provider, please call [phone] and they will assist you.      [employer] is an electronic gateway that provides easy, online access to your medical records. With [employer], you can request a clinic appointment, read your test results, renew a prescription or communicate with your care team.     To access your existing account, please contact your [facility] or call [phone] for assistance.        Care EveryWhere ID     This is your Care EveryWhere ID. This could be used by other organizations to access your [place] medical records  [id number]         Blood Pressure from Last 3 Encounters:    09/22/14 128/66    Weight from Last 3 Encounters:   09/23/14 76.7 kg (169 lb) (93 %)*     * Growth percentiles are based on CDC 2-20 Years data.              We Performed the Following     DESTRUCT BENIGN LESION, UP TO 14        Primary Care Provider Fax #    Physician No Ref-Primary [phone]       No address on file        Equal Access to Services     [facility]: Hadii aad ku hadasho Soomaali, waaxda luqadaha, qaybta kaalmada adeegyada, robert soodin amadan adetemo massey lalisaangeli . So [facility] [phone].    ATENCIÓN: Si habla español, tiene a dailey disposición servicios gratuitos de asistencia lingüística. Llame al [phone].    We comply with applicable federal civil rights laws and [place] laws. We do not discriminate on the basis of race, color, national origin, age, disability, sex, sexual orientation, or gender identity.            Thank you!     Thank you for choosing [facility] DERMATOLOGY  for your care. Our goal is always to provide you with excellent care. Hearing back from our patients is one way we can continue to improve our services. Please take a few minutes to complete the written survey that you may receive in the mail after your visit with us. Thank you!             Your Updated Medication List - Protect others around you: Learn how to safely use, store and throw away your medicines at www.disposemymeds.org.          This list is accurate as of 11/14/18  4:31 PM.  Always use your most recent med list.                   Brand Name Dispense Instructions for use Diagnosis    adapalene-benzoyl peroxide 0.1-2.5 % gel    EPIDUO    45 g    APPLY A PEA-SIZE AMOUNT TO THE FACE NIGHTLY AT BEDTIME AS TOLERATED.    Acne vulgaris       doxycycline monohydrate 100 MG capsule     90 capsule    Take 1 capsule (100 mg) by mouth daily    Acne vulgaris       SELEGILINE HCL PO      Take 30 mg by mouth daily        XULANE 150-35 MCG/24HR patch   Generic drug:  norelgestromin-ethinyl estradiol      APPLY 1 PATCH  WEEKLY, USE CONTINUSOULY

## 2018-11-14 NOTE — NURSING NOTE
Dermatology Rooming Note    Hanna Berg's goals for this visit include:   Chief Complaint   Patient presents with     Derm Problem     Hanna is here today for a wart follow up- notes improvment.      MARY ANNE Elizondo      Airway patent, Nasal mucosa clear. Mouth with normal mucosa. Throat has no vesicles, no oropharyngeal exudates and uvula is midline.

## 2018-11-14 NOTE — LETTER
2018       RE: Hanna Berg  [address]  [address]  [facility] [facility] [place] [address]     Dear Colleague,    Thank you for referring your patient, Hanna Berg, to the [facility] DERMATOLOGY [facility]. Please see a copy of my visit note below.    [facility] Dermatology Note      Dermatology Problem List:  1.acne vulgaris - current: doxycycline 100mg po QD, epiduo   -Previous Tx: Bactrim, Accutane ~, various Rx topicals  2. Verruca plantaris, plantar surface of right foot  - s/p paring & cryo- 10/17/18, 2018    Encounter Date: 2018    CC:  Chief Complaint   Patient presents with     Derm Problem     Hanna is here today for a wart follow up- notes improvment.          History of Present Illness:  Ms. Hanna Berg is a 23 year old female who presents as a follow-up for wart. The patient was last seen 10/17/2018 when cryo was administered. At today's visit the patient states that her acne is much better and her wart has improved. She states that she has been using the Compound W at home. The patient denies painful, itching, tingling or bleeding lesions unless otherwise noted.      Past Medical History:   Patient Active Problem List   Diagnosis     Suicidal ideation     Past Medical History:   Diagnosis Date     Depressive disorder      Scoliosis      Past Surgical History:   Procedure Laterality Date     HC TOOTH EXTRACTION W/FORCEP         Social History:   reports that she has never smoked. She has never used smokeless tobacco. She reports that she does not drink alcohol or use illicit drugs.    Family History:  Family History   Problem Relation Age of Onset     Endocrine Disease Mother      hypothyroidism     C.A.D. Paternal Grandfather       MI     Prostate Cancer Maternal Grandfather      Depression Maternal Grandmother      Lipids Maternal Grandmother      Melanoma No family hx of      Skin Cancer No family hx of         Medications:  Current Outpatient Prescriptions   Medication Sig Dispense Refill     adapalene-benzoyl peroxide (EPIDUO) 0.1-2.5 % gel APPLY A PEA-SIZE AMOUNT TO THE FACE NIGHTLY AT BEDTIME AS TOLERATED. 45 g 6     doxycycline monohydrate 100 MG capsule Take 1 capsule (100 mg) by mouth daily 90 capsule 0     SELEGILINE HCL PO Take 30 mg by mouth daily       XULANE 150-35 MCG/24HR patch APPLY 1 PATCH WEEKLY, USE CONTINUSOULY  8       No Known Allergies    Review of Systems:  -Constitutional: The patient denies fatigue, fevers, chills, unintended weight loss, and night sweats.  -Skin: As above in HPI. No additional skin concerns.    Physical exam:  Vitals: There were no vitals taken for this visit.  GEN: This is a well developed, well-nourished female in no acute distress, in a pleasant mood.    SKIN: Focused examination of the face and left foot was performed.  -There are 8 verrucous papules with thrombosed capillaries interrupting dermatoglyphics on the right planter wart, some of them near the 4th metatarsal that are in a mosaic pattern.    -No other lesions of concern on areas examined.       Impression/Plan:  1. Acne vulgaris on the face, well controlled on current therap. S/p accutane (isotretinoin) in high school    Continue Doxycycline 100 mg every day, attempt to taper at f/u.     Continue using epiduo nightly    Consider starting Spironolactone in the future. Discussed side effects and benefits as an alternative to an oral antibiotic.     2. Verruca plantaris and Verruca vulgaris    Cryotherapy procedure note: After verbal consent and discussion of risks and benefits including but no limited to dyspigmentation/scar, blister, and pain, 8 was(were) treated with 1-2mm freeze border for 2 cycles with liquid nitrogen. Post cryotherapy instructions were provided.     Continue to use OTC salicylic acid treatments after lesions have healed from today's cryotherapy.    We will clinically monitor this area.    CC  Dr. Jones on close of this encounter.  Follow-up in 3 weeks, earlier for new or changing lesions.       Staff Involved:    Scribe Disclosure  I, Jeremias Hartman, am serving as a scribe to document services personally performed by Raine Rain PA-C, based on data collection and the provider's statements to me.     Provider Disclosure:   The documentation recorded by the scribe accurately reflects the services I personally performed and the decisions made by me.    All risks, benefits and alternatives were discussed with patient.  Patient is in agreement and understands the assessment and plan.  All questions were answered.    Raine Rain PA-C  [facility]: Phone: [phone], Fax: [phone]                    Again, thank you for allowing me to participate in the care of your patient.      Sincerely,    Raine Rain PA-C

## 2018-12-05 ENCOUNTER — OFFICE VISIT (OUTPATIENT)
Dept: DERMATOLOGY | Facility: CLINIC | Age: 23
End: 2018-12-05
Payer: COMMERCIAL

## 2018-12-05 DIAGNOSIS — L85.3 XEROSIS OF SKIN: ICD-10-CM

## 2018-12-05 DIAGNOSIS — L84 CALLUS OF HEEL: ICD-10-CM

## 2018-12-05 DIAGNOSIS — B07.0 PLANTAR WART, RIGHT FOOT: Primary | ICD-10-CM

## 2018-12-05 DIAGNOSIS — B07.0 PLANTAR WART: Primary | ICD-10-CM

## 2018-12-05 RX ORDER — AMMONIUM LACTATE 12 G/100G
CREAM TOPICAL 2 TIMES DAILY PRN
Qty: 385 G | Refills: 3 | Status: SHIPPED | OUTPATIENT
Start: 2018-12-05

## 2018-12-05 ASSESSMENT — PAIN SCALES - GENERAL: PAINLEVEL: NO PAIN (0)

## 2018-12-05 NOTE — MR AVS SNAPSHOT
After Visit Summary   12/5/2018    Hanna Berg    MRN: [id number]           Patient Information     Date Of Birth          1995        Visit Information        Provider Department      12/5/2018 2:00 PM Raine Rain PA-C M [facility] Dermatology        Today's Diagnoses     Plantar wart, right foot    -  1    Callus of heel           Follow-ups after your visit        Follow-up notes from your care team     Return in about 2 weeks (around 12/19/2018).      Your next 10 appointments already scheduled     Dec 19, 2018  1:45 PM CST   (Arrive by 1:30 PM)   Return Visit with JEANIE Lindquist [facility] Dermatology ([facility] [place])    [address]  [address] [facility] [address] [phone]              Who to contact     Please call your clinic at [phone] to:    Ask questions about your health    Make or cancel appointments    Discuss your medicines    Learn about your test results    Speak to your doctor            Additional Information About Your Visit        [employer] Information     [employer] gives you secure access to your electronic health record. If you see a primary care provider, you can also send messages to your care team and make appointments. If you have questions, please call your primary care clinic.  If you do not have a primary care provider, please call [phone] and they will assist you.      [employer] is an electronic gateway that provides easy, online access to your medical records. With [employer], you can request a clinic appointment, read your test results, renew a prescription or communicate with your care team.     To access your existing account, please contact your [facility] or call [phone] for assistance.        Care EveryWhere ID     This is your Care EveryWhere ID. This could be used by other organizations to access your [facility] medical records  [id number]         Blood Pressure from  Last 3 Encounters:   09/22/14 128/66    Weight from Last 3 Encounters:   09/23/14 76.7 kg (169 lb) (93 %)*     * Growth percentiles are based on CDC 2-20 Years data.              We Performed the Following     DESTRUCT BENIGN LESION, UP TO 14          Today's Medication Changes          These changes are accurate as of 12/5/18  3:03 PM.  If you have any questions, ask your nurse or doctor.               Start taking these medicines.        Dose/Directions    ammonium lactate 12 % external cream   Commonly known as:  LAC-HYDRIN   Used for:  Plantar wart, Xerosis of skin   Started by:  Raine Rain PA-C        Apply topically 2 times daily as needed for dry skin   Quantity:  385 g   Refills:  3            Where to get your medicines      These medications were sent to [facility] [id number] [place], [place] - [address] [place] [address]  [address] [place] [address]     Phone:  [phone]     ammonium lactate 12 % external cream                Primary Care Provider Fax #    Physician No Ref-Primary [phone]       No address on file        Equal Access to Services     PARMINDER WHALEY : Hadsheryl evanso Soomaali, waaxda luqadaha, qaybta kaalmada adetemoyaaracelis, robert king . So [facility] [phone].    ATENCIÓN: Si habla español, tiene a dailey disposición servicios gratuitos de asistencia lingüística. Llame al [phone].    We comply with applicable federal civil rights laws and [place] laws. We do not discriminate on the basis of race, color, national origin, age, disability, sex, sexual orientation, or gender identity.            Thank you!     Thank you for choosing [facility] DERMATOLOGY  for your care. Our goal is always to provide you with excellent care. Hearing back from our patients is one way we can continue to improve our services. Please take a few minutes to complete the written survey that you may receive in the mail after your visit with us. Thank you!              Your Updated Medication List - Protect others around you: Learn how to safely use, store and throw away your medicines at www.disposemymeds.org.          This list is accurate as of 12/5/18  3:03 PM.  Always use your most recent med list.                   Brand Name Dispense Instructions for use Diagnosis    adapalene-benzoyl peroxide 0.1-2.5 % gel    EPIDUO    45 g    APPLY A PEA-SIZE AMOUNT TO THE FACE NIGHTLY AT BEDTIME AS TOLERATED.    Acne vulgaris       ammonium lactate 12 % external cream    LAC-HYDRIN    385 g    Apply topically 2 times daily as needed for dry skin    Plantar wart, Xerosis of skin       doxycycline monohydrate 100 MG capsule    MONODOX    90 capsule    Take 1 capsule (100 mg) by mouth daily    Acne vulgaris       SELEGILINE HCL PO      Take 30 mg by mouth daily        XULANE 150-35 MCG/24HR patch   Generic drug:  norelgestromin-ethinyl estradiol      APPLY 1 PATCH WEEKLY, USE CONTINUSOULY

## 2018-12-05 NOTE — LETTER
2018       RE: Hanna Berg  [address]  [address]  [place] [address]     Dear Colleague,    Thank you for referring your patient, Hanna Berg, to the [facility] DERMATOLOGY at [facility]. Please see a copy of my visit note below.    [facility] Dermatology Note      Dermatology Problem List:  1.acne vulgaris - current: doxycycline 100mg po QD, epiduo   -Previous Tx: Bactrim, Accutane ~, various Rx topicals  2. Verruca plantaris, plantar surface of right foot  - s/p paring & cryo- 10/17/18, 2018, cantharone 18  3. Heel callus - Lac-Hydrin    Encounter Date: Dec 5, 2018    CC:  Chief Complaint   Patient presents with     Derm Problem     Hanna is here today for a wart follow up- notes no change.          History of Present Illness:  Ms. Hanna Berg is a 23 year old female who presents as a follow-up for wart. The patient was last seen 18 when cryo was administered. At today's visit the patient states that her warts have improved.  Patient also points out that she has dry and cracking skin on the bottom of her feet. The patient denies painful, itching, tingling or bleeding lesions unless otherwise noted.      Past Medical History:   Patient Active Problem List   Diagnosis     Suicidal ideation     Past Medical History:   Diagnosis Date     Depressive disorder      Scoliosis      Past Surgical History:   Procedure Laterality Date     HC TOOTH EXTRACTION W/FORCEP         Social History:   reports that she has never smoked. She has never used smokeless tobacco. She reports that she does not drink alcohol or use illicit drugs.    Family History:  Family History   Problem Relation Age of Onset     Endocrine Disease Mother      hypothyroidism     C.A.D. Paternal Grandfather       MI     Prostate Cancer Maternal Grandfather      Depression Maternal Grandmother      Lipids Maternal Grandmother      Melanoma No family hx of       Skin Cancer No family hx of        Medications:  Current Outpatient Prescriptions   Medication Sig Dispense Refill     adapalene-benzoyl peroxide (EPIDUO) 0.1-2.5 % gel APPLY A PEA-SIZE AMOUNT TO THE FACE NIGHTLY AT BEDTIME AS TOLERATED. 45 g 6     doxycycline monohydrate 100 MG capsule Take 1 capsule (100 mg) by mouth daily 90 capsule 0     SELEGILINE HCL PO Take 30 mg by mouth daily       XULANE 150-35 MCG/24HR patch APPLY 1 PATCH WEEKLY, USE CONTINUSOULY  8       No Known Allergies    Review of Systems:  -Constitutional: The patient denies fatigue, fevers, chills, unintended weight loss, and night sweats.  -Skin: As above in HPI. No additional skin concerns.    Physical exam:  Vitals: There were no vitals taken for this visit.  GEN: This is a well developed, well-nourished female in no acute distress, in a pleasant mood.    SKIN: Focused examination of the face and left foot was performed.  -There are 8 verrucous papules with thrombosed capillaries interrupting dermatoglyphics on the right planter wart, some of them near the 4th metatarsal that are in a mosaic pattern.    -No other lesions of concern on areas examined.       Impression/Plan:  1. Dry callused heels    Start Lac-Hydrin BID - this will also help the warts    2. Verruca plantaris     Site(s) was pared prior to procedure    Cantherone was applied to right planter wart.  Instructions were given to wash with soap and water in 3-4 hours. Counseled not to place treated lesions in mouth. Counseled that area will become irritated and likely blister. Post care instructions provided in written and verbal format.     Cryotherapy procedure note: After verbal consent and discussion of risks and benefits including but no limited to dyspigmentation/scar, blister, and pain, 8 was(were) treated with 1-2mm freeze border for 2 cycles with liquid nitrogen. Post cryotherapy instructions were provided.     Continue to use OTC salicylic acid treatments after lesions have  healed from today's cryotherapy.    Start ammonium lactate 12% external cream, apply topically 2 times daily for dry skin     We will clinically monitor this area.    CC Dr. Jones on close of this encounter.  Follow-up in 2 weeks, earlier for new or changing lesions.       Staff Involved:    Scribe Disclosure  I, Jeremias Devan, am serving as a scribe to document services personally performed by Raine Rain PA-C, based on data collection and the provider's statements to me.     Provider Disclosure:   The documentation recorded by the scribe accurately reflects the services I personally performed and the decisions made by me.    All risks, benefits and alternatives were discussed with patient.  Patient is in agreement and understands the assessment and plan.  All questions were answered.    Raine Rain PA-C  [facility]: Phone: [phone], Fax: [phone]                              Again, thank you for allowing me to participate in the care of your patient.      Sincerely,    Raine Rain PA-C

## 2018-12-05 NOTE — NURSING NOTE
Dermatology Rooming Note    Hanna Berg's goals for this visit include:   Chief Complaint   Patient presents with     Derm Problem     Hanna is here today for a wart follow up- notes no change.      MARY ANNE Elizondo

## 2018-12-05 NOTE — PROGRESS NOTES
[facility] Dermatology Note      Dermatology Problem List:  1.acne vulgaris - current: doxycycline 100mg po QD, epiduo   -Previous Tx: Bactrim, Accutane ~, various Rx topicals  2. Verruca plantaris, plantar surface of right foot  - s/p paring & cryo- 10/17/18, 2018, cantharone 18  3. Heel callus - Lac-Hydrin    Encounter Date: Dec 5, 2018    CC:  Chief Complaint   Patient presents with     Derm Problem     Hanna is here today for a wart follow up- notes no change.          History of Present Illness:  Ms. Hanna Berg is a 23 year old female who presents as a follow-up for wart. The patient was last seen 18 when cryo was administered. At today's visit the patient states that her warts have improved.  Patient also points out that she has dry and cracking skin on the bottom of her feet. The patient denies painful, itching, tingling or bleeding lesions unless otherwise noted.      Past Medical History:   Patient Active Problem List   Diagnosis     Suicidal ideation     Past Medical History:   Diagnosis Date     Depressive disorder      Scoliosis      Past Surgical History:   Procedure Laterality Date     HC TOOTH EXTRACTION W/FORCEP         Social History:   reports that she has never smoked. She has never used smokeless tobacco. She reports that she does not drink alcohol or use illicit drugs.    Family History:  Family History   Problem Relation Age of Onset     Endocrine Disease Mother      hypothyroidism     C.A.D. Paternal Grandfather       MI     Prostate Cancer Maternal Grandfather      Depression Maternal Grandmother      Lipids Maternal Grandmother      Melanoma No family hx of      Skin Cancer No family hx of        Medications:  Current Outpatient Prescriptions   Medication Sig Dispense Refill     adapalene-benzoyl peroxide (EPIDUO) 0.1-2.5 % gel APPLY A PEA-SIZE AMOUNT TO THE FACE NIGHTLY AT BEDTIME AS TOLERATED. 45 g 6     doxycycline monohydrate 100 MG  capsule Take 1 capsule (100 mg) by mouth daily 90 capsule 0     SELEGILINE HCL PO Take 30 mg by mouth daily       XULANE 150-35 MCG/24HR patch APPLY 1 PATCH WEEKLY, USE CONTINUSOULY  8       No Known Allergies    Review of Systems:  -Constitutional: The patient denies fatigue, fevers, chills, unintended weight loss, and night sweats.  -Skin: As above in HPI. No additional skin concerns.    Physical exam:  Vitals: There were no vitals taken for this visit.  GEN: This is a well developed, well-nourished female in no acute distress, in a pleasant mood.    SKIN: Focused examination of the face and left foot was performed.  -There are 8 verrucous papules with thrombosed capillaries interrupting dermatoglyphics on the right planter wart, some of them near the 4th metatarsal that are in a mosaic pattern.    -No other lesions of concern on areas examined.       Impression/Plan:  1. Dry callused heels    Start Lac-Hydrin BID - this will also help the warts    2. Verruca plantaris     Site(s) was pared prior to procedure    Cantherone was applied to right planter wart.  Instructions were given to wash with soap and water in 3-4 hours. Counseled not to place treated lesions in mouth. Counseled that area will become irritated and likely blister. Post care instructions provided in written and verbal format.     Cryotherapy procedure note: After verbal consent and discussion of risks and benefits including but no limited to dyspigmentation/scar, blister, and pain, 8 was(were) treated with 1-2mm freeze border for 2 cycles with liquid nitrogen. Post cryotherapy instructions were provided.     Continue to use OTC salicylic acid treatments after lesions have healed from today's cryotherapy.    Start ammonium lactate 12% external cream, apply topically 2 times daily for dry skin     We will clinically monitor this area.    CC Dr. Jones on close of this encounter.  Follow-up in 2 weeks, earlier for new or changing lesions.        Staff Involved:    Scribe Disclosure  I, Jeremias Devan, am serving as a scribe to document services personally performed by Raine Rain PA-C, based on data collection and the provider's statements to me.     Provider Disclosure:   The documentation recorded by the scribe accurately reflects the services I personally performed and the decisions made by me.    All risks, benefits and alternatives were discussed with patient.  Patient is in agreement and understands the assessment and plan.  All questions were answered.    Raine Rain PA-C  [facility]: Phone: [phone], Fax: [phone]

## 2019-01-02 ENCOUNTER — OFFICE VISIT (OUTPATIENT)
Dept: DERMATOLOGY | Facility: CLINIC | Age: 24
End: 2019-01-02
Payer: COMMERCIAL

## 2019-01-02 DIAGNOSIS — L70.0 ACNE VULGARIS: ICD-10-CM

## 2019-01-02 DIAGNOSIS — B07.0 PLANTAR WART OF RIGHT FOOT: Primary | ICD-10-CM

## 2019-01-02 RX ORDER — BUPROPION HYDROCHLORIDE 150 MG/1
450 TABLET ORAL EVERY MORNING
COMMUNITY
End: 2019-04-18

## 2019-01-02 RX ORDER — CANDIDA ALBICANS 1000 [PNU]/ML
0.1 INJECTION, SOLUTION INTRADERMAL ONCE
Status: COMPLETED | OUTPATIENT
Start: 2019-01-02 | End: 2019-01-02

## 2019-01-02 RX ORDER — DOXYCYCLINE 100 MG/1
100 CAPSULE ORAL DAILY
Qty: 90 CAPSULE | Refills: 0 | Status: SHIPPED | OUTPATIENT
Start: 2019-01-02 | End: 2019-06-01 | Stop reason: ALTCHOICE

## 2019-01-02 RX ADMIN — CANDIDA ALBICANS 0.1 ML: 1000 INJECTION, SOLUTION INTRADERMAL at 16:33

## 2019-01-02 ASSESSMENT — PAIN SCALES - GENERAL: PAINLEVEL: NO PAIN (0)

## 2019-01-02 NOTE — LETTER
Date:January 3, 2019      Patient was self referred, no letter generated. Do not send.        [facility] Information

## 2019-01-02 NOTE — LETTER
1/2/2019       RE: Hanna Berg  [address]  [address]  [place] [address]     Dear Colleague,    Thank you for referring your patient, Hanna Berg, to the [facility] DERMATOLOGY at [facility]. Please see a copy of my visit note below.    [facility] Dermatology Note      Dermatology Problem List:  1.acne vulgaris - current: doxycycline 100mg po QD, epiduo   -Previous Tx: Bactrim, Accutane ~2012, various Rx topicals  2. Verruca plantaris, plantar surface of right foot  - s/p paring & cryo- 10/17/18, 11/14/2018, cantharone 12/5/18  3. Heel callus - Lac-Hydrin    Encounter Date: Jan 2, 2019    CC:  Chief Complaint   Patient presents with     Derm Problem     Wart follow up , Hanna states her wart has not improved much since her last visit .     History of Present Illness:  Ms. Hanna Berg is a 23 year old female who presents as a follow-up for wart. The patient was last seen in the dermatology clinic on 12/05/18 by Katelynn Rain PA-C during which she started Lac-Hydrin regarding her callused heels and 8 verruca plantaris on her right foot were treated with cryotherapy and received an application of cantherone.     Today she reports that there has been a little improvement in her wart since her last visit- but the wart is still present. As for her acne, she has found that the doxycycline has been controlling her acne very well. She rarely has to use the epiduo.     Otherwise the patient reports no additional painful, bleeding, nonhealing or pruritic lesions and denies any new or changing moles. The patient denies headache, GI upset or sun burn.       Past Medical History:   Patient Active Problem List   Diagnosis     Suicidal ideation     Past Medical History:   Diagnosis Date     Depressive disorder      Scoliosis      Past Surgical History:   Procedure Laterality Date     HC TOOTH EXTRACTION W/FORCEP         Social History:   reports that  has never  smoked. she has never used smokeless tobacco. She reports that she does not drink alcohol or use drugs.    Family History:  Family History   Problem Relation Age of Onset     Endocrine Disease Mother         hypothyroidism     C.A.D. Paternal Grandfather          MI     Prostate Cancer Maternal Grandfather      Depression Maternal Grandmother      Lipids Maternal Grandmother      Melanoma No family hx of      Skin Cancer No family hx of        Medications:  Current Outpatient Medications   Medication Sig Dispense Refill     adapalene-benzoyl peroxide (EPIDUO) 0.1-2.5 % gel APPLY A PEA-SIZE AMOUNT TO THE FACE NIGHTLY AT BEDTIME AS TOLERATED. 45 g 6     ammonium lactate (LAC-HYDRIN) 12 % external cream Apply topically 2 times daily as needed for dry skin 385 g 3     buPROPion (WELLBUTRIN XL) 150 MG 24 hr tablet Take 450 mg by mouth every morning       doxycycline monohydrate 100 MG capsule Take 1 capsule (100 mg) by mouth daily 90 capsule 0     etonogestrel (IMPLANON/NEXPLANON) 68 MG IMPL 1 each by Subdermal route once       SELEGILINE HCL PO Take 30 mg by mouth daily       XULANE 150-35 MCG/24HR patch APPLY 1 PATCH WEEKLY, USE CONTINUSOULY  8       No Known Allergies    Review of Systems:  -Constitutional: The patient denies fatigue, fevers, chills, unintended weight loss, and night sweats.  -Skin: As above in HPI. No additional skin concerns.    Physical exam:  Vitals: There were no vitals taken for this visit.  GEN: This is a well developed, well-nourished female in no acute distress, in a pleasant mood.    SKIN: Focused examination of the face and right foot was performed.  -There are 8 verrucous papules with thrombosed capillaries interrupting dermatoglyphics on the right planter wart, some of them near the 4th metatarsal that are in a mosaic pattern.    - Face is clear of pustules and papules  -No other lesions of concern on areas examined.       Impression/Plan:  1. Verruca plantaris, right plantar foot s/p  cryotherapy,  cantharone     Site was pared prior to procedure    After cleansing with alcohol, a total of 0.4 cc of candida antigen was injected into 3 different suitable lesions on the right foot.  The patient tolerated the procedure well.        Continue to use OTC salicylic acid treatments after lesions have healed from today's cryotherapy.    Continue ammonium lactate 12% external cream, apply topically 2 times daily for dry skin     2. Acne vulgaris, well controlled on current therapy    Continue Doxycycline 100 mg, attempt to reduce to every other day.    Continue using epiduo nightly    Follow-up in 6 weeks, earlier for new or changing lesions.       Staff Involved:  Scribe/Staff    Scribe Disclosure:   Helen CORONADO, am serving as a scribe to document services personally performed by Vidya De Luna PA-C, based on data collection and the provider's statements to me.    Provider Disclosure:   The documentation recorded by the scribe accurately reflects the services I personally performed and the decisions made by me.    All risks, benefits and alternatives were discussed with patient.  Patient is in agreement and understands the assessment and plan.  All questions were answered.  Sun Screen Education was given.   Return to Clinic in 6 weeks or sooner as needed.   Vidya De Luna PA-C   [facility]                             Again, thank you for allowing me to participate in the care of your patient.      Sincerely,    Vidya De Luna PA-C

## 2019-01-02 NOTE — NURSING NOTE
Prior to injection, verified patient identity using patient's name and date of birth.  Due to injection administration, patient instructed to remain in clinic for 15 minutes  afterwards, and to report any adverse reaction to me immediately.    CandidaCandida     Drug Amount Wasted:  0.6ml   Vial/Syringe: Multi dose vial  Expiration Date:  1/23/2021

## 2019-01-02 NOTE — NURSING NOTE
Dermatology Rooming Note    Hanna Berg's goals for this visit include:   Chief Complaint   Patient presents with     Derm Problem     Wart follow up , Hanna states her wart has not improved much since her last visit .     Shayna Brantley LPN

## 2019-01-02 NOTE — PROGRESS NOTES
[facility] Dermatology Note      Dermatology Problem List:  1.acne vulgaris - current: doxycycline 100mg po QD, epiduo   -Previous Tx: Bactrim, Accutane ~, various Rx topicals  2. Verruca plantaris, plantar surface of right foot  - s/p paring & cryo- 10/17/18, 2018, cantharone 18  3. Heel callus - Lac-Hydrin    Encounter Date: 2019    CC:  Chief Complaint   Patient presents with     Derm Problem     Wart follow up , Hanna states her wart has not improved much since her last visit .     History of Present Illness:  Ms. Hanna Berg is a 23 year old female who presents as a follow-up for wart. The patient was last seen in the dermatology clinic on 18 by Katelynn Rain PA-C during which she started Lac-Hydrin regarding her callused heels and 8 verruca plantaris on her right foot were treated with cryotherapy and received an application of cantherone.     Today she reports that there has been a little improvement in her wart since her last visit- but the wart is still present. As for her acne, she has found that the doxycycline has been controlling her acne very well. She rarely has to use the epiduo.     Otherwise the patient reports no additional painful, bleeding, nonhealing or pruritic lesions and denies any new or changing moles. The patient denies headache, GI upset or sun burn.       Past Medical History:   Patient Active Problem List   Diagnosis     Suicidal ideation     Past Medical History:   Diagnosis Date     Depressive disorder      Scoliosis      Past Surgical History:   Procedure Laterality Date     HC TOOTH EXTRACTION W/FORCEP         Social History:   reports that  has never smoked. she has never used smokeless tobacco. She reports that she does not drink alcohol or use drugs.    Family History:  Family History   Problem Relation Age of Onset     Endocrine Disease Mother         hypothyroidism     C.A.D. Paternal Grandfather          MI     Prostate  Cancer Maternal Grandfather      Depression Maternal Grandmother      Lipids Maternal Grandmother      Melanoma No family hx of      Skin Cancer No family hx of        Medications:  Current Outpatient Medications   Medication Sig Dispense Refill     adapalene-benzoyl peroxide (EPIDUO) 0.1-2.5 % gel APPLY A PEA-SIZE AMOUNT TO THE FACE NIGHTLY AT BEDTIME AS TOLERATED. 45 g 6     ammonium lactate (LAC-HYDRIN) 12 % external cream Apply topically 2 times daily as needed for dry skin 385 g 3     buPROPion (WELLBUTRIN XL) 150 MG 24 hr tablet Take 450 mg by mouth every morning       doxycycline monohydrate 100 MG capsule Take 1 capsule (100 mg) by mouth daily 90 capsule 0     etonogestrel (IMPLANON/NEXPLANON) 68 MG IMPL 1 each by Subdermal route once       SELEGILINE HCL PO Take 30 mg by mouth daily       XULANE 150-35 MCG/24HR patch APPLY 1 PATCH WEEKLY, USE CONTINUSOULY  8       No Known Allergies    Review of Systems:  -Constitutional: The patient denies fatigue, fevers, chills, unintended weight loss, and night sweats.  -Skin: As above in HPI. No additional skin concerns.    Physical exam:  Vitals: There were no vitals taken for this visit.  GEN: This is a well developed, well-nourished female in no acute distress, in a pleasant mood.    SKIN: Focused examination of the face and right foot was performed.  -There are 8 verrucous papules with thrombosed capillaries interrupting dermatoglyphics on the right planter wart, some of them near the 4th metatarsal that are in a mosaic pattern.    - Face is clear of pustules and papules  -No other lesions of concern on areas examined.       Impression/Plan:  1. Verruca plantaris, right plantar foot s/p cryotherapy,  cantharone     Site was pared prior to procedure    After cleansing with alcohol, a total of 0.4 cc of candida antigen was injected into 3 different suitable lesions on the right foot.  The patient tolerated the procedure well.        Continue to use OTC salicylic acid  treatments after lesions have healed from today's cryotherapy.    Continue ammonium lactate 12% external cream, apply topically 2 times daily for dry skin     2. Acne vulgaris, well controlled on current therapy    Continue Doxycycline 100 mg, attempt to reduce to every other day.    Continue using epiduo nightly    Follow-up in 6 weeks, earlier for new or changing lesions.       Staff Involved:  Scribe/Staff    Scribe Disclosure:   I, Helen Hoff, am serving as a scribe to document services personally performed by Vidya De Luna PA-C, based on data collection and the provider's statements to me.    Provider Disclosure:   The documentation recorded by the scribe accurately reflects the services I personally performed and the decisions made by me.    All risks, benefits and alternatives were discussed with patient.  Patient is in agreement and understands the assessment and plan.  All questions were answered.  Sun Screen Education was given.   Return to Clinic in 6 weeks or sooner as needed.   Vidya De Luna PA-C   [facility]

## 2019-01-15 ENCOUNTER — TELEPHONE (OUTPATIENT)
Dept: PSYCHIATRY | Facility: CLINIC | Age: 24
End: 2019-01-15

## 2019-01-15 NOTE — TELEPHONE ENCOUNTER
PSYCHIATRY CLINIC PHONE INTAKE     SERVICES REQUESTED / INTERESTED IN          Med Management    Presenting Problem and Brief History                              What would you like to be seen for? (brief description):  Patient has a difficult time completing daily tasks, mood swings, anxiety in big crowds, abandonment issues, suicidal ideation. Panic attacks do not occur often anymore, mostly happen if already stressed and then put into crowd situation or sees a certain person from past. Patient was prescribed bupropion 450mg but was discontinued due to a seizure (side effect from medication).  Have you received a mental health diagnosis? Yes   Which one (s): Depression, Anxiety, Social anxiety, BPD  Is there any history of developmental delay?  No   Are you currently seeing a mental health provider?  Yes            Who / month last seen:  Therapist at [facility] in [place]. Patient was seeing a psychiatrist at [place] but cannot continue because she graduated.  Do you have mental health records elsewhere?  Yes  Will you sign a release so we can obtain them?  Yes    Have you ever been hospitalized for psychiatric reasons?  Yes  Describe:  Multiple times. 2013 for suicidal ideation, 2015 partial hospitalization, Feb 2016 for feelings of hopelessness, May 2016 for suicide attempt.    Do you have current thoughts of self-harm?  Yes  - frequent. No plan or means  Do you currently have thoughts of harming others?  No       Substance Use History     Do you have any history of alcohol / illicit drug use?  No  Describe:    Have you ever received treatment for this?  No    Describe:       Social History     Does the patient have a guardian?  No    Name / number:   Have you had an ACT team in last 12 months?  No  Describe:    Do you have any current or past legal issues?  No  Describe:    OK to leave a detailed voicemail?  Yes    Medical/ Surgical History                                   Patient Active  Problem List   Diagnosis     Suicidal ideation          Medications             Current Outpatient Medications   Medication Sig Dispense Refill     adapalene-benzoyl peroxide (EPIDUO) 0.1-2.5 % gel APPLY A PEA-SIZE AMOUNT TO THE FACE NIGHTLY AT BEDTIME AS TOLERATED. 45 g 6     ammonium lactate (LAC-HYDRIN) 12 % external cream Apply topically 2 times daily as needed for dry skin 385 g 3     buPROPion (WELLBUTRIN XL) 150 MG 24 hr tablet Take 450 mg by mouth every morning       doxycycline monohydrate (MONODOX) 100 MG capsule Take 1 capsule (100 mg) by mouth daily 90 capsule 0     etonogestrel (IMPLANON/NEXPLANON) 68 MG IMPL 1 each by Subdermal route once       SELEGILINE HCL PO Take 30 mg by mouth daily       XULANE 150-35 MCG/24HR patch APPLY 1 PATCH WEEKLY, USE CONTINUSOULY  8     No current meds    DISPOSITION      Completed phone screen with patient. Scheduled AGE with HERRERA Herrera CNP.    Franci Stout,

## 2019-03-12 ENCOUNTER — TELEPHONE (OUTPATIENT)
Dept: PSYCHIATRY | Facility: CLINIC | Age: 24
End: 2019-03-12

## 2019-03-12 ENCOUNTER — OFFICE VISIT (OUTPATIENT)
Dept: DERMATOLOGY | Facility: CLINIC | Age: 24
End: 2019-03-12
Payer: COMMERCIAL

## 2019-03-12 ENCOUNTER — DOCUMENTATION ONLY (OUTPATIENT)
Dept: CARE COORDINATION | Facility: CLINIC | Age: 24
End: 2019-03-12

## 2019-03-12 DIAGNOSIS — B07.0 PLANTAR WART OF RIGHT FOOT: ICD-10-CM

## 2019-03-12 DIAGNOSIS — L70.0 COMEDONAL ACNE: Primary | ICD-10-CM

## 2019-03-12 RX ORDER — CANDIDA ALBICANS 1000 [PNU]/ML
0.3 INJECTION, SOLUTION INTRADERMAL ONCE
Status: COMPLETED | OUTPATIENT
Start: 2019-03-12 | End: 2019-03-12

## 2019-03-12 RX ORDER — TRETINOIN 0.25 MG/G
CREAM TOPICAL
Qty: 45 G | Refills: 11 | Status: SHIPPED | OUTPATIENT
Start: 2019-03-12 | End: 2020-08-10

## 2019-03-12 RX ADMIN — CANDIDA ALBICANS 0.3 ML: 1000 INJECTION, SOLUTION INTRADERMAL at 16:03

## 2019-03-12 ASSESSMENT — PAIN SCALES - GENERAL: PAINLEVEL: NO PAIN (0)

## 2019-03-12 NOTE — PROGRESS NOTES
Drug Administration Record    Prior to injection, verified patient identity using patient's name and date of birth.  Due to injection administration, patient instructed to remain in clinic for 15 minutes  afterwards, and to report any adverse reaction to me immediately.    Drug Name: candida antigen or triamcinolone acetonide(kenalog)  Dose: 0.3mL of candida antigen  Route administered: ID  NDC #: [id number]: Candida ([id number])  Amount of waste(mL):0.7  Reason for waste: Multi dose vial used as single use    LOT #:   SITE: see note  : [employer]  EXPIRATION DATE: 1/23/2021

## 2019-03-12 NOTE — NURSING NOTE
Chief Complaint   Patient presents with     Wart     Hanna is here today to be seen for warts on her foot. Patient notes some improvement. Hanna would like to have rash on her chest looked at today as well.

## 2019-03-12 NOTE — LETTER
3/12/2019       RE: Hanna Berg  [address]  [address]  [facility] [address]     Dear Colleague,    Thank you for referring your patient, Hanna Berg, to the [facility] DERMATOLOGY at [facility]. Please see a copy of my visit note below.    [facility] Dermatology Note      Dermatology Problem List:  1.acne vulgaris - current: doxycycline 100mg po QD, epiduo   -Previous Tx: Bactrim, Accutane ~2012, various Rx topicals  2. Verruca plantaris, plantar surface of right foot, Candida inject 1/02/19  - s/p paring & cryo- 10/17/18, 11/14/2018, cantharone 12/5/18  3. Heel callus - Lac-Hydrin    Encounter Date: Mar 12, 2019    CC:  Chief Complaint   Patient presents with     Wart     Hanna is here today to be seen for warts on her foot. Patient notes some improvement. Hanna would like to have rash on her chest looked at today as well.      History of Present Illness:  Ms. Hanna Berg is a 23 year old female who presents as a follow-up for wart. The patient was last seen in the dermatology clinic on 01/22/19 during which 0.4 ml of candida was injected into 3 verruca on her right plantar foot. Today she reports the warts on her right foot are a little smaller, but are still present. She has been using otc topicals diligently.     She also reports that her chest has been more red and bumpy than usual. Denies using any new beauty products or necklaces. She has been using the same moisturizers on her face and chest for years. She does not use the Epiduo on this area regularly, as it is very drying for her. As for her acne, she is unsure if her current regimen is effectively controlling this. She experienced a persistent break out about a month ago, which has since resolved. She has changed from a salicylic wash to a black soap, with noted improvement in her acne. Otherwise she is feeling well, without additional skin concerns.        Past Medical History:   Patient  Active Problem List   Diagnosis     Suicidal ideation     Past Medical History:   Diagnosis Date     Depressive disorder      Scoliosis      Past Surgical History:   Procedure Laterality Date     HC TOOTH EXTRACTION W/FORCEP         Social History:   reports that  has never smoked. she has never used smokeless tobacco. She reports that she does not drink alcohol or use drugs.    Family History:  Family History   Problem Relation Age of Onset     Endocrine Disease Mother         hypothyroidism     C.A.D. Paternal Grandfather          MI     Prostate Cancer Maternal Grandfather      Depression Maternal Grandmother      Lipids Maternal Grandmother      Melanoma No family hx of      Skin Cancer No family hx of        Medications:  Current Outpatient Medications   Medication Sig Dispense Refill     adapalene-benzoyl peroxide (EPIDUO) 0.1-2.5 % gel APPLY A PEA-SIZE AMOUNT TO THE FACE NIGHTLY AT BEDTIME AS TOLERATED. 45 g 6     ammonium lactate (LAC-HYDRIN) 12 % external cream Apply topically 2 times daily as needed for dry skin 385 g 3     buPROPion (WELLBUTRIN XL) 150 MG 24 hr tablet Take 450 mg by mouth every morning       doxycycline monohydrate (MONODOX) 100 MG capsule Take 1 capsule (100 mg) by mouth daily 90 capsule 0     etonogestrel (IMPLANON/NEXPLANON) 68 MG IMPL 1 each by Subdermal route once       SELEGILINE HCL PO Take 30 mg by mouth daily       XULANE 150-35 MCG/24HR patch APPLY 1 PATCH WEEKLY, USE CONTINUSOULY  8       No Known Allergies    Review of Systems:  -Constitutional: The patient denies fatigue, fevers, chills, unintended weight loss, and night sweats.  -Skin: As above in HPI. No additional skin concerns.    Physical exam:  Vitals: There were no vitals taken for this visit.  GEN: This is a well developed, well-nourished female in no acute distress, in a pleasant mood.    SKIN: Focused examination of the face, neck , chest, upper back and right foot was performed.  -There are 8 verrucous papules  with thrombosed capillaries interrupting dermatoglyphics on the right planter foot, some of them near the 4th metatarsal that are in a mosaic pattern. All appear thinner than previously.  - Minimal comedones to face. No large inflammatory papules  - Few inflammatory papules on her upper back  - Open comedones clustered on central chest. Some closed comedones on her upper back.   -No other lesions of concern on areas examined.       Impression/Plan:  1. Verruca plantaris, right plantar foot s/p cryotherapy, cantharone     Site was pared prior to procedure    After cleansing with alcohol, a total of 0.3 cc of candida antigen was injected into 3 different suitable lesions on the right foot.  The patient tolerated the procedure well.        Continue to use OTC salicylic acid treatments after lesions are non tender.     2. Acne vulgaris    As she is still experiencing break outs with current regimen, will consider changing medications. We will plan to change medications at follow up, as she may change her psychiatric medication d/t recent seizure history.     Discussed risks and benefits of spironolactone as an alternative to long-term oral antibiotic use. Will initiate at follow up if still experiencing persistent acne eruptions.    Continue Doxycycline 100 mg, attempt to reduce to every other day.    Continue using epiduo nightly    Start BPO wash, apply to affected areas on chest 2-3 times weekly     Start tretinoin 0.025 % cream to face, chest and back. Instructed to apply topical acne medication once every other day and increase to nightly as tolerate.  Waiting 20-30 minutes after washing affected area(s) will decrease irritation. Method of application, side effects and expected results were discussed. The patient will apply pea size amount to the entire face, avoid areas around the eyes, corners of nose and mouth. Discussed side effects including photosensitivity and irritation. Discussed medication is pregnancy  category C and patient should stop medication and contact clinic if she becomes pregnant. Appropriate handout provided.    Follow-up in 6 weeks, earlier for new or changing lesions.       Staff Involved:  Scribe/Staff    Scribe Disclosure:   I, Helen Hoff, am serving as a scribe to document services personally performed by Vidya De Luna PA-C, based on data collection and the provider's statements to me.    Provider Disclosure:   The documentation recorded by the scribe accurately reflects the services I personally performed and the decisions made by me.    All risks, benefits and alternatives were discussed with patient.  Patient is in agreement and understands the assessment and plan.  All questions were answered.  Sun Screen Education was given.   Return to Clinic in 6 weeks or sooner as needed.   Vidya De Luna PA-C   [facility]                           Drug Administration Record    Prior to injection, verified patient identity using patient's name and date of birth.  Due to injection administration, patient instructed to remain in clinic for 15 minutes  afterwards, and to report any adverse reaction to me immediately.    Drug Name: candida antigen or triamcinolone acetonide(kenalog)  Dose: 0.3mL of candida antigen  Route administered: ID  NDC #: [id number]: Candida (44453-402-04)  Amount of waste(mL):0.7  Reason for waste: Multi dose vial used as single use    LOT #:   SITE: see note  : [employer]  EXPIRATION DATE: 1/23/2021      Again, thank you for allowing me to participate in the care of your patient.      Sincerely,    Vidya De Luna PA-C

## 2019-03-12 NOTE — PROGRESS NOTES
[facility] Dermatology Note      Dermatology Problem List:  1.acne vulgaris - current: doxycycline 100mg po QD, epiduo   -Previous Tx: Bactrim, Accutane ~2012, various Rx topicals  2. Verruca plantaris, plantar surface of right foot, Candida inject 1/02/19  - s/p paring & cryo- 10/17/18, 11/14/2018, cantharone 12/5/18  3. Heel callus - Lac-Hydrin    Encounter Date: Mar 12, 2019    CC:  Chief Complaint   Patient presents with     Wart     Hanna is here today to be seen for warts on her foot. Patient notes some improvement. Hanna would like to have rash on her chest looked at today as well.      History of Present Illness:  Ms. Hanna Berg is a 23 year old female who presents as a follow-up for wart. The patient was last seen in the dermatology clinic on 01/22/19 during which 0.4 ml of candida was injected into 3 verruca on her right plantar foot. Today she reports the warts on her right foot are a little smaller, but are still present. She has been using otc topicals diligently.     She also reports that her chest has been more red and bumpy than usual. Denies using any new beauty products or necklaces. She has been using the same moisturizers on her face and chest for years. She does not use the Epiduo on this area regularly, as it is very drying for her. As for her acne, she is unsure if her current regimen is effectively controlling this. She experienced a persistent break out about a month ago, which has since resolved. She has changed from a salicylic wash to a black soap, with noted improvement in her acne. Otherwise she is feeling well, without additional skin concerns.        Past Medical History:   Patient Active Problem List   Diagnosis     Suicidal ideation     Past Medical History:   Diagnosis Date     Depressive disorder      Scoliosis      Past Surgical History:   Procedure Laterality Date     HC TOOTH EXTRACTION W/FORCEP         Social History:   reports that  has never smoked.  she has never used smokeless tobacco. She reports that she does not drink alcohol or use drugs.    Family History:  Family History   Problem Relation Age of Onset     Endocrine Disease Mother         hypothyroidism     C.A.D. Paternal Grandfather          MI     Prostate Cancer Maternal Grandfather      Depression Maternal Grandmother      Lipids Maternal Grandmother      Melanoma No family hx of      Skin Cancer No family hx of        Medications:  Current Outpatient Medications   Medication Sig Dispense Refill     adapalene-benzoyl peroxide (EPIDUO) 0.1-2.5 % gel APPLY A PEA-SIZE AMOUNT TO THE FACE NIGHTLY AT BEDTIME AS TOLERATED. 45 g 6     ammonium lactate (LAC-HYDRIN) 12 % external cream Apply topically 2 times daily as needed for dry skin 385 g 3     buPROPion (WELLBUTRIN XL) 150 MG 24 hr tablet Take 450 mg by mouth every morning       doxycycline monohydrate (MONODOX) 100 MG capsule Take 1 capsule (100 mg) by mouth daily 90 capsule 0     etonogestrel (IMPLANON/NEXPLANON) 68 MG IMPL 1 each by Subdermal route once       SELEGILINE HCL PO Take 30 mg by mouth daily       XULANE 150-35 MCG/24HR patch APPLY 1 PATCH WEEKLY, USE CONTINUSOULY  8       No Known Allergies    Review of Systems:  -Constitutional: The patient denies fatigue, fevers, chills, unintended weight loss, and night sweats.  -Skin: As above in HPI. No additional skin concerns.    Physical exam:  Vitals: There were no vitals taken for this visit.  GEN: This is a well developed, well-nourished female in no acute distress, in a pleasant mood.    SKIN: Focused examination of the face, neck , chest, upper back and right foot was performed.  -There are 8 verrucous papules with thrombosed capillaries interrupting dermatoglyphics on the right planter foot, some of them near the 4th metatarsal that are in a mosaic pattern. All appear thinner than previously.  - Minimal comedones to face. No large inflammatory papules  - Few inflammatory papules on her  upper back  - Open comedones clustered on central chest. Some closed comedones on her upper back.   -No other lesions of concern on areas examined.       Impression/Plan:  1. Verruca plantaris, right plantar foot s/p cryotherapy, cantharone     Site was pared prior to procedure    After cleansing with alcohol, a total of 0.3 cc of candida antigen was injected into 3 different suitable lesions on the right foot.  The patient tolerated the procedure well.        Continue to use OTC salicylic acid treatments after lesions are non tender.     2. Acne vulgaris    As she is still experiencing break outs with current regimen, will consider changing medications. We will plan to change medications at follow up, as she may change her psychiatric medication d/t recent seizure history.     Discussed risks and benefits of spironolactone as an alternative to long-term oral antibiotic use. Will initiate at follow up if still experiencing persistent acne eruptions.    Continue Doxycycline 100 mg, attempt to reduce to every other day.    Continue using epiduo nightly    Start BPO wash, apply to affected areas on chest 2-3 times weekly     Start tretinoin 0.025 % cream to face, chest and back. Instructed to apply topical acne medication once every other day and increase to nightly as tolerate.  Waiting 20-30 minutes after washing affected area(s) will decrease irritation. Method of application, side effects and expected results were discussed. The patient will apply pea size amount to the entire face, avoid areas around the eyes, corners of nose and mouth. Discussed side effects including photosensitivity and irritation. Discussed medication is pregnancy category C and patient should stop medication and contact clinic if she becomes pregnant. Appropriate handout provided.    Follow-up in 6 weeks, earlier for new or changing lesions.       Staff Involved:  Scribe/Staff    Scribe Disclosure:   Helen CORONADO, am serving as a scribe to  document services personally performed by Vidya De Luna PA-C, based on data collection and the provider's statements to me.    Provider Disclosure:   The documentation recorded by the scribe accurately reflects the services I personally performed and the decisions made by me.    All risks, benefits and alternatives were discussed with patient.  Patient is in agreement and understands the assessment and plan.  All questions were answered.  Sun Screen Education was given.   Return to Clinic in 6 weeks or sooner as needed.   Vidya De Luna PA-C   [facility]

## 2019-03-12 NOTE — TELEPHONE ENCOUNTER
On 1/27/2019 the patient signed a EDDIE to release records from [facility] to [facility]. I faxed the form to [phone] and I sent the EDDIE to scanning and kept a copy in psychiatry until scanning is complete/confirmed. Molly Hoskins MA

## 2019-03-12 NOTE — LETTER
Date:March 14, 2019      Patient was self referred, no letter generated. Do not send.        [facility] Information

## 2019-03-21 ENCOUNTER — OFFICE VISIT (OUTPATIENT)
Dept: PSYCHIATRY | Facility: CLINIC | Age: 24
End: 2019-03-21
Attending: NURSE PRACTITIONER
Payer: COMMERCIAL

## 2019-03-21 VITALS — DIASTOLIC BLOOD PRESSURE: 75 MMHG | SYSTOLIC BLOOD PRESSURE: 121 MMHG | HEART RATE: 84 BPM | WEIGHT: 206 LBS

## 2019-03-21 DIAGNOSIS — F39 MOOD DISORDER (H): Primary | ICD-10-CM

## 2019-03-21 PROCEDURE — G0463 HOSPITAL OUTPT CLINIC VISIT: HCPCS | Mod: ZF

## 2019-03-21 RX ORDER — GABAPENTIN 100 MG/1
100 CAPSULE ORAL 3 TIMES DAILY
Qty: 90 CAPSULE | Refills: 0 | Status: SHIPPED | OUTPATIENT
Start: 2019-03-21 | End: 2019-06-04

## 2019-03-21 RX ORDER — LAMOTRIGINE 25 MG/1
TABLET ORAL
Qty: 50 TABLET | Refills: 0 | Status: SHIPPED | OUTPATIENT
Start: 2019-03-21 | End: 2019-04-18

## 2019-03-21 ASSESSMENT — PAIN SCALES - GENERAL: PAINLEVEL: NO PAIN (0)
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
## 2019-04-12 DIAGNOSIS — F39 MOOD DISORDER (H): ICD-10-CM

## 2019-04-15 RX ORDER — GABAPENTIN 100 MG/1
CAPSULE ORAL
Qty: 90 CAPSULE | Refills: 0 | OUTPATIENT
Start: 2019-04-15

## 2019-04-18 ENCOUNTER — OFFICE VISIT (OUTPATIENT)
Dept: PSYCHIATRY | Facility: CLINIC | Age: 24
End: 2019-04-18
Attending: NURSE PRACTITIONER
Payer: COMMERCIAL

## 2019-04-18 VITALS — DIASTOLIC BLOOD PRESSURE: 62 MMHG | SYSTOLIC BLOOD PRESSURE: 129 MMHG | WEIGHT: 207.8 LBS | HEART RATE: 71 BPM

## 2019-04-18 DIAGNOSIS — F39 MOOD DISORDER (H): ICD-10-CM

## 2019-04-18 PROCEDURE — G0463 HOSPITAL OUTPT CLINIC VISIT: HCPCS | Mod: ZF

## 2019-04-18 RX ORDER — LAMOTRIGINE 100 MG/1
TABLET ORAL
Qty: 45 TABLET | Refills: 0 | Status: SHIPPED | OUTPATIENT
Start: 2019-04-18 | End: 2019-05-24

## 2019-04-18 ASSESSMENT — PAIN SCALES - GENERAL: PAINLEVEL: NO PAIN (0)

## 2019-04-18 ASSESSMENT — PATIENT HEALTH QUESTIONNAIRE - PHQ9: SUM OF ALL RESPONSES TO PHQ QUESTIONS 1-9: 21

## 2019-04-18 NOTE — PROGRESS NOTES
Psychiatry Clinic Progress Note                                                                   Hanna Berg is a 24 year old female who returns to the clinic for follow-up care.  Therapist: Jo Ann @ [facility]  PCP: No Ref-Primary, Physician  Other Providers: None    Pertinent Background:  See previous notes.  Psych critical item history includes suicide attempt [single], suicidal ideation, SIB [cutting], mutiple psychotropic trials, psych hosp (3-5) and TMS    .     Interim History                                                                                                        4, 4     The patient is a good historian, reports good treatment adherence and was last seen 3/21/2019.  Since the last visit, Hanna reports she obtained a full-time job as an  at [employer].  Having a schedule has been helpful.  She has tolerated Lamictal without side effects.  Unfortunately, Hanna stopped seeing her therapist due to provider having medical issue. She is scheduled to see another therapist.  Affect quite bright today.  Continues to experience passive suicidal ideation.  No plan or intent.  Expresses interest in TRD team.  Gabapentin has been helpful for acute anxiety.  Hanna has not been taking scheduled so decided to use as PRN.    Previous medication trials:  Wellbutrin  Sertraline  Trintellix  Lexapro  Celexa  Viibryd  Pristiq  Effexor  Cymbalta  Selegiline  Also completed TMS at [facility] in 2015.  Not effective    Recent Symptoms:   Depression:  suicidal ideation without plan, without intent, depressed mood, anhedonia, low energy, insomnia, appetite changes, poor concentration /memory, feeling worthless and feeling hopeless  Elevated:  none  Psychosis:  none  Anxiety:  nervous/overwhelmed  Panic Attack:  none  Trauma Related:  none     Recent Substance Use:  Alcohol- yes, 3 times per week.  2-5 drinks.  Occasionally to intoxication. , Tobacco- no , Caffeine-  minimal, Opioids- no    Narcan Kit- N/A , Cannabis- yes, twice in past year  and Other Illicit Drugs-none          Social/ Family History                                  [per patient report]                                 1ea,1ea   FINANCIAL SUPPORT- unemployed.  recent college graduate       CHILDREN- None       LIVING SITUATION- Lives alone in apartment in [place]      LEGAL- None  EARLY HISTORY/ EDUCATION- Grew up in [place].  Graduated high school.  Recently graduated from [employer] in [place]  SOCIAL/ SPIRITUAL SUPPORT- parents, brothers, 2 close friends       TRAUMA HISTORY (self-report)- see history  FEELS SAFE AT HOME- Yes  FAMILY HISTORY-  Brother with anxiety      Medical / Surgical History                                                                                                                  Patient Active Problem List   Diagnosis     Suicidal ideation       Past Surgical History:   Procedure Laterality Date     HC TOOTH EXTRACTION W/FORCEP          Medical Review of Systems                                                                                                    2,10   The remainder of the review of systems is noncontributory  Allergy                                Patient has no known allergies.  Current Medications                                                                                                       Current Outpatient Medications   Medication Sig Dispense Refill     ammonium lactate (LAC-HYDRIN) 12 % external cream Apply topically 2 times daily as needed for dry skin 385 g 3     buPROPion (WELLBUTRIN XL) 150 MG 24 hr tablet Take 450 mg by mouth every morning       doxycycline monohydrate (MONODOX) 100 MG capsule Take 1 capsule (100 mg) by mouth daily 90 capsule 0     etonogestrel (IMPLANON/NEXPLANON) 68 MG IMPL 1 each by Subdermal route once       gabapentin (NEURONTIN) 100 MG capsule Take 1 capsule (100 mg) by mouth 3 times daily 90 capsule 0      lamoTRIgine (LAMICTAL) 25 MG tablet Take 1 tablet (25mg) daily for 2 weeks then increase to 2 tablets (50mg) for weeks 3 and 4 50 tablet 0     SELEGILINE HCL PO Take 30 mg by mouth daily       tretinoin (RETIN-A) 0.025 % external cream Use every night to chest and upper back. 45 g 11     XULANE 150-35 MCG/24HR patch APPLY 1 PATCH WEEKLY, USE CONTINUSOULY  8     adapalene-benzoyl peroxide (EPIDUO) 0.1-2.5 % gel APPLY A PEA-SIZE AMOUNT TO THE FACE NIGHTLY AT BEDTIME AS TOLERATED. (Patient not taking: Reported on 3/21/2019) 45 g 6     Vitals                                                                                                                       3, 3   /62   Pulse 71   Wt 94.3 kg (207 lb 12.8 oz)    Mental Status Exam                                                                                    9, 14 cog gs     Alertness: alert  and oriented  Appearance: casually groomed  Behavior/Demeanor: cooperative and pleasant, with good  eye contact   Speech: normal  Language: no problems  Psychomotor: normal or unremarkable  Mood: depressed and anxious  Affect: full range; was congruent to mood; was congruent to content  Thought Process/Associations: unremarkable  Thought Content:  Reports suicidal ideation without plan; without intent [details in Interim History];  Denies violent ideation  Perception:  Reports none;  Denies auditory hallucinations and visual hallucinations  Insight: good  Judgment: adequate for safety  Cognition: (6) does  appear grossly intact; formal cognitive testing was not done  Gait/Station and/or Muscle Strength/Tone: unremarkable    Labs and Data                                                                                                                 Rating Scales:    PHQ9    PHQ9 Today:  Not completed  PHQ-9 SCORE 10/17/2018   PHQ-9 Total Score 21         Diagnosis and Assessment                                                                             m2, h3     Today  the following issues were addressed:    1) Unspecified Mood Disorder  2) Generalized Anxiety Disorder  3) Borderline Personality Disorder (Hx)     [place] Prescription [place] [] was not checked today:  not taking controlled medications      Plan                                                                                                                    m2, h3      Increase Lamictal to 100mg for 2 weeks then increase to 200mg daily   Continue Gabapentin 100mg up to 3 times per day as needed for anxiety     2) Therapy  Continue with current therapist     RTC: 1 month    CRISIS NUMBERS:   Provided routinely in AVS.    Treatment Risk Statement:  The patient understands the risks, benefits, adverse effects and alternatives. Agrees to treatment with the capacity to do so. No medical contraindications to treatment. Agrees to call clinic for any problems. The patient understands to call 911 or go to the nearest ED if life threatening or urgent symptoms occur.        PROVIDER:  HERRERA Arthur CNP   Starting BD   CT scan chest should be able to clarify more of this lung parenchyma status!!

## 2019-05-16 ENCOUNTER — OFFICE VISIT (OUTPATIENT)
Dept: PSYCHIATRY | Facility: CLINIC | Age: 24
End: 2019-05-16
Attending: NURSE PRACTITIONER
Payer: COMMERCIAL

## 2019-05-16 VITALS — SYSTOLIC BLOOD PRESSURE: 117 MMHG | WEIGHT: 206 LBS | HEART RATE: 73 BPM | DIASTOLIC BLOOD PRESSURE: 74 MMHG

## 2019-05-16 DIAGNOSIS — F39 MOOD DISORDER (H): ICD-10-CM

## 2019-05-16 PROCEDURE — G0463 HOSPITAL OUTPT CLINIC VISIT: HCPCS | Mod: ZF

## 2019-05-16 ASSESSMENT — PAIN SCALES - GENERAL: PAINLEVEL: NO PAIN (0)

## 2019-05-16 NOTE — PROGRESS NOTES
"  Psychiatry Clinic Progress Note                                                                   Hanna Berg is a 24 year old female who returns to the clinic for follow-up care.  Therapist: Jo Ann @ [facility]  PCP: No Ref-Primary, Physician  Other Providers: None    Pertinent Background:  See previous notes.  Psych critical item history includes suicide attempt [single], suicidal ideation, SIB [cutting], mutiple psychotropic trials, psych hosp (3-5) and TMS    .     Interim History                                                                                                        4, 4     The patient is a good historian, reports good treatment adherence and was last seen 4/18/19.  Since the last visit, Hanna reports she is \"not great.\"  Increased Lamictal to 200mg last week with no benefit.  No concerns with side effects or rash.  She has been more active and spending more time outside.  Also has been spending more times with friends.  Has been meeting friends at [employer] and happy hour 3 times per week.  Hanna experiences respite from her depression and SI when she is with friends.  The structure her job provides also helps manage SI.  Continues to endorse suicidal ideation without intent or plan.  Future oriented as she has plans with parents at end of month.  Hanna laughingly stated \"it would be fucking rude if they came up here and I was dead.\"   Sleep is described as \"fine.\"  Recommended she take Gabapentin an hour before bedtime to help with sleep.  Contracted for safety until next visit in 5 weeks    4/18/19: Hanna reports she obtained a full-time job as an  at [employer].  Having a schedule has been helpful.  She has tolerated Lamictal without side effects.  Unfortunately, Hanna stopped seeing her therapist due to provider having medical issue. She is scheduled to see another therapist.  Affect quite bright today.  Continues to experience passive " suicidal ideation.  No plan or intent.  Expresses interest in TRD team.  Gabapentin has been helpful for acute anxiety.  Hanna has not been taking scheduled so decided to use as PRN.    Previous medication trials (none beneficial):   Wellbutrin  Sertraline  Trintellix  Lexapro  Celexa  Viibryd  Pristiq  Effexor  Cymbalta  Selegiline  Lithium (briefly helped with suicidal ideation but stopped being effective after 2 weeks)  Also completed TMS at [facility] in 2015.  Not effective    Recent Symptoms:   Depression:  suicidal ideation without plan, without intent, depressed mood, anhedonia, low energy, insomnia, appetite changes, poor concentration /memory, feeling worthless and feeling hopeless  Elevated:  none  Psychosis:  none  Anxiety:  nervous/overwhelmed  Panic Attack:  none  Trauma Related:  none     Recent Substance Use:  Alcohol- yes, 3 times per week.  2-5 drinks.  Occasionally to intoxication. , Tobacco- no , Caffeine- minimal, Opioids- no    Narcan Kit- N/A , Cannabis- yes, twice in past year  and Other Illicit Drugs-none          Social/ Family History                                  [per patient report]                                 1ea,1ea   FINANCIAL SUPPORT- unemployed.  recent college graduate       CHILDREN- None       LIVING SITUATION- Lives alone in apartment in [place]      LEGAL- None  EARLY HISTORY/ EDUCATION- Grew up in [place].  Graduated high school.  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 [employer]  SOCIAL/ SPIRITUAL SUPPORT- parents, brothers, 2 close friends       TRAUMA HISTORY (self-report)- see history  FEELS SAFE AT HOME- Yes  FAMILY HISTORY-  Brother with anxiety      Medical / Surgical History                                                                                                                  Patient Active Problem List   Diagnosis     Suicidal ideation       Past Surgical History:   Procedure Laterality Date     HC TOOTH EXTRACTION W/FORCEP           Medical Review of Systems                                                                                                    2,10   The remainder of the review of systems is noncontributory  Allergy                                Patient has no known allergies.  Current Medications                                                                                                       Current Outpatient Medications   Medication Sig Dispense Refill     ammonium lactate (LAC-HYDRIN) 12 % external cream Apply topically 2 times daily as needed for dry skin 385 g 3     doxycycline monohydrate (MONODOX) 100 MG capsule Take 1 capsule (100 mg) by mouth daily 90 capsule 0     etonogestrel (IMPLANON/NEXPLANON) 68 MG IMPL 1 each by Subdermal route once       gabapentin (NEURONTIN) 100 MG capsule Take 1 capsule (100 mg) by mouth 3 times daily 90 capsule 0     lamoTRIgine (LAMICTAL) 100 MG tablet Take 1 tablet (100mg) for 2 weeks then increase 2 tablets (200mg) daily 45 tablet 0     tretinoin (RETIN-A) 0.025 % external cream Use every night to chest and upper back. 45 g 11     Vitals                                                                                                                       3, 3   /74   Pulse 73   Wt 93.4 kg (206 lb)    Mental Status Exam                                                                                    9, 14 cog gs     Alertness: alert  and oriented  Appearance: casually groomed  Behavior/Demeanor: cooperative, pleasant and calm, with good  eye contact   Speech: regular rate and rhythm  Language: no problems  Psychomotor: normal or unremarkable  Mood: depressed  Affect: full range; was congruent to mood; was congruent to content  Thought Process/Associations: unremarkable  Thought Content:  Reports suicidal ideation without plan; without intent [details in Interim History];  Denies violent ideation  Perception:  Reports none;  Denies auditory hallucinations and visual  hallucinations  Insight: good  Judgment: adequate for safety  Cognition: (6) does  appear grossly intact; formal cognitive testing was not done  Gait/Station and/or Muscle Strength/Tone: unremarkable    Labs and Data                                                                                                                 Rating Scales:    PHQ9    PHQ9 Today:  20  PHQ-9 SCORE 10/17/2018 3/21/2019   PHQ-9 Total Score 21 21         Diagnosis and Assessment                                                                             m2, h3     Today the following issues were addressed:    1) Unspecified Mood Disorder  2) Generalized Anxiety Disorder  3) Borderline Personality Disorder (Hx)     [place] Prescription Monitoring Program [] was not checked today:  not taking controlled medications      Plan                                                                                                                    m2, h3      Continue Lamictal 200mg for the next week. Provider will call Hanna to assess tolerability and increase if indicated  Continue Gabapentin 100mg up to 3 times per day as needed for anxiety     2) Therapy  Continue with current therapist     RTC: requested 5 weeks due to copays.    CRISIS NUMBERS:   Provided routinely in AVS.    Treatment Risk Statement:  The patient understands the risks, benefits, adverse effects and alternatives. Agrees to treatment with the capacity to do so. No medical contraindications to treatment. Agrees to call clinic for any problems. The patient understands to call 911 or go to the nearest ED if life threatening or urgent symptoms occur.        PROVIDER:  HERRERA Arthur CNP

## 2019-05-24 ENCOUNTER — TELEPHONE (OUTPATIENT)
Dept: PSYCHIATRY | Facility: CLINIC | Age: 24
End: 2019-05-24

## 2019-05-24 DIAGNOSIS — F39 MOOD DISORDER (H): ICD-10-CM

## 2019-05-24 RX ORDER — LAMOTRIGINE 100 MG/1
200 TABLET ORAL DAILY
Qty: 60 TABLET | Refills: 0 | Status: SHIPPED | OUTPATIENT
Start: 2019-05-24 | End: 2019-07-02

## 2019-05-24 NOTE — TELEPHONE ENCOUNTER
Cherry Rodriguez Laura, RN   Phone Number: [phone]             Norberto was going to call pt last night to discuss raising dose of lamitrogene and getting the refill, but he didn't call last night (because he's sick, I would guess) Pt is totally out of the med and needs the new rx [phone]          Writer called pt and confirmed that she is completely out of the medication. Pt is currently at 200 mg daily. She has not missed any doses. Pt feels the medication has not been effective, but also states it has not caused any SEs. Due to provider being out of clinic, note being unsigned, and pt being out of medication, writer agreed to refill medication at current dose. Will provide 30 d/s of 100 mg tabs, as pt has a co-pay for this medication. 100 mg tabs deliberately provided so pt can make increase with this Rx if provider prescribes increases the dose next week. Reminder set to f/up with provider regarding this.

## 2019-05-29 ENCOUNTER — OFFICE VISIT (OUTPATIENT)
Dept: DERMATOLOGY | Facility: CLINIC | Age: 24
End: 2019-05-29
Payer: COMMERCIAL

## 2019-05-29 VITALS — SYSTOLIC BLOOD PRESSURE: 121 MMHG | HEART RATE: 86 BPM | DIASTOLIC BLOOD PRESSURE: 73 MMHG

## 2019-05-29 DIAGNOSIS — L70.0 ACNE VULGARIS: Primary | ICD-10-CM

## 2019-05-29 DIAGNOSIS — B07.0 PLANTAR WART OF RIGHT FOOT: ICD-10-CM

## 2019-05-29 DIAGNOSIS — Z51.81 MEDICATION MONITORING ENCOUNTER: ICD-10-CM

## 2019-05-29 RX ORDER — SPIRONOLACTONE 100 MG/1
100 TABLET, FILM COATED ORAL DAILY
Qty: 90 TABLET | Refills: 1 | Status: SHIPPED | OUTPATIENT
Start: 2019-05-29 | End: 2020-11-23

## 2019-05-29 RX ORDER — CIMETIDINE 800 MG
800 TABLET ORAL AT BEDTIME
Qty: 90 TABLET | Refills: 1 | Status: SHIPPED | OUTPATIENT
Start: 2019-05-29 | End: 2020-08-10

## 2019-05-29 ASSESSMENT — PAIN SCALES - GENERAL: PAINLEVEL: NO PAIN (0)

## 2019-05-29 ASSESSMENT — PATIENT HEALTH QUESTIONNAIRE - PHQ9: SUM OF ALL RESPONSES TO PHQ QUESTIONS 1-9: 21

## 2019-05-29 NOTE — NURSING NOTE
Dermatology Rooming Note    Hanna Berg's goals for this visit include:   Chief Complaint   Patient presents with     Acne     Hanna is here today for a follow up. Says the acne and warts are not doing good. Nothing has changed on the wart and her acne has worsened.     Daniella Dhaliwal, [facility]

## 2019-05-29 NOTE — PROGRESS NOTES
[facility] Dermatology Note      Dermatology Problem List:  1. Acne vulgaris - current: spironolactone 50 mg BID and tretinoin 0.025% cream   - Previous Tx: Bactrim, Accutane ~2012, various Rx topicals, doxycycline 100mg po QD, epiduo   2. Verruca plantaris, plantar surface of right foot,   - s/p paring & cryo 10/17/18, 11/14/18, cantharone 12/5/18, candida injection 1/2/19  3. Heel callus - Lac-Hydrin    Encounter Date: May 29, 2019    CC:  Chief Complaint   Patient presents with     Acne     Hanna is here today for a follow up. Says the acne and warts are not doing good. Nothing has changed on the wart and her acne has worsened.     History of Present Illness:  Ms. Hanna Berg is a 24 year old female who presents as a follow-up for warts and acne. The patient was last seen in the dermatology clinic on 3/12/19 when 0.3 ml of candida antigen was injected into 3 warts on her right foot. She also started tretinoin 0.025% cream and benzoyl peroxide wash at that time for her acne. Today she reports that her acne has not gotten better. She doesn't feel like the doxycyline has been doing anything. She also stopped the tretinoin because it was way too drying. She gets deeper, painful pimples every few days. The acne has worsened on her back. Her wart doesn't seem to have changed either. She feels discouraged that neither of these problems have improved. The patient is otherwise feeling well. There are no other skin concerns at this time.      Past Medical History:   Patient Active Problem List   Diagnosis     Suicidal ideation     Past Medical History:   Diagnosis Date     Depressive disorder      Scoliosis      Past Surgical History:   Procedure Laterality Date     HC TOOTH EXTRACTION W/FORCEP         Social History:   reports that she has never smoked. She has never used smokeless tobacco. She reports that she does not drink alcohol or use drugs. She uses alcohol socially.     Family  History:  Family History   Problem Relation Age of Onset     Endocrine Disease Mother         hypothyroidism     C.A.D. Paternal Grandfather          MI     Prostate Cancer Maternal Grandfather      Depression Maternal Grandmother      Lipids Maternal Grandmother      Melanoma No family hx of      Skin Cancer No family hx of        Medications:  Current Outpatient Medications   Medication Sig Dispense Refill     ammonium lactate (LAC-HYDRIN) 12 % external cream Apply topically 2 times daily as needed for dry skin 385 g 3     cimetidine (TAGAMET) 800 MG tablet Take 1 tablet (800 mg) by mouth At Bedtime 90 tablet 1     doxycycline monohydrate (MONODOX) 100 MG capsule Take 1 capsule (100 mg) by mouth daily 90 capsule 0     etonogestrel (IMPLANON/NEXPLANON) 68 MG IMPL 1 each by Subdermal route once       gabapentin (NEURONTIN) 100 MG capsule Take 1 capsule (100 mg) by mouth 3 times daily 90 capsule 0     lamoTRIgine (LAMICTAL) 100 MG tablet Take 2 tablets (200 mg) by mouth daily 60 tablet 0     spironolactone (ALDACTONE) 100 MG tablet Take 1 tablet (100 mg) by mouth daily 90 tablet 1     lamoTRIgine (LAMICTAL) 100 MG tablet Take 3 tablets (300 mg) by mouth daily       tretinoin (RETIN-A) 0.025 % external cream Use every night to chest and upper back. (Patient not taking: Reported on 2019) 45 g 11       No Known Allergies    Review of Systems:  -Constitutional: The patient denies fatigue, fevers, chills, unintended weight loss, and night sweats.  -Skin: As above in HPI. No additional skin concerns.  -Denies any plan for self harm. Has chronic depression.     Physical exam:  Vitals: /73 (BP Location: Right arm, Patient Position: Sitting, Cuff Size: Adult Regular)   Pulse 86   GEN: This is a well developed, well-nourished female in no acute distress, in a pleasant mood.    SKIN: Focused examination of the face, neck , chest, upper back and right foot was performed.  - 11 verrucous papules with thrombosed  capillaries interrupting dermatoglyphics on the right planter foot, some of them near the 4th metatarsal that are in a mosaic pattern.   - Numerous closed comedones to face and back.   - Few inflammatory papules on her upper back.  - Open comedones clustered on central chest. Some closed comedones on her upper back.  - No other lesions of concern on areas examined.       Impression/Plan:  1.  Verruca plantaris, right plantar foot s/p cryotherapy, cantharone, candida     Sites were pared with a dermablade.        Continue to use OTC salicylic acid treatments after lesions are non tender.     Recommended zinc supplementation and cimetidine 800 mg qhs.     2.  Acne vulgaris, flaring while on doxycyline.     Start spironolactone 100 daily as patient is still experiencing break outs with current regimen.(Start with 50 mg daily for the first week)     Side effects of spironolactone discussed including postural hypotension, increased frequency of urination, breast tenderness, menstrual spotting, cardiac arrythmias and the need for contraception due to fetal feminization.      Baseline blood pressure, potassium, and BUN/Cr obtained. Discussed with patient that medication will need to be stopped if pregnancy is desired. Had a CMP in January 2019 with normal findings.    Continue BPO wash, apply to affected areas on chest 2-3 times weekly.     Restart tretinoin 0.025% cream. Recommended decrease frequency of use or apply only to forehead/back to avoid excessive dryness. Pt can also mix with moisturizer.   3. Depression. Pt has an elevated depression screening score.   She denies any plans for self harm and has a psychiatrist and therapist. She plans to f/u with psychiatrist for mediation check. Pt believes she is stable.     Follow-up in 3 months, earlier for new or changing lesions.       Staff Involved:  Staff Only    Scribe Disclosure:  Gen CORONADO, am serving as a scribe to document services personally  performed by Vidya De Luna PA-C, based on data collection and the provider's statements to me.     Provider Disclosure:   The documentation recorded by the scribe accurately reflects the services I personally performed and the decisions made by me.    All risks, benefits and alternatives were discussed with patient.  Patient is in agreement and understands the assessment and plan.  All questions were answered.  Sun Screen Education was given.   Return to Clinic in 3 months or sooner as needed.   Vidya De Luna PA-C   [facility]

## 2019-05-29 NOTE — LETTER
5/29/2019       RE: Hanna Berg  [address]  [address]  [facility] [address]     Dear Colleague,    Thank you for referring your patient, Hanna Berg, to the [facility] DERMATOLOGY at [facility]. Please see a copy of my visit note below.    [facility] Dermatology Note      Dermatology Problem List:  1. Acne vulgaris - current: spironolactone 50 mg BID and tretinoin 0.025% cream   - Previous Tx: Bactrim, Accutane ~2012, various Rx topicals, doxycycline 100mg po QD, epiduo   2. Verruca plantaris, plantar surface of right foot,   - s/p paring & cryo 10/17/18, 11/14/18, cantharone 12/5/18, candida injection 1/2/19  3. Heel callus - Lac-Hydrin    Encounter Date: May 29, 2019    CC:  Chief Complaint   Patient presents with     Acne     Hanna is here today for a follow up. Says the acne and warts are not doing good. Nothing has changed on the wart and her acne has worsened.     History of Present Illness:  Ms. Hanna Berg is a 24 year old female who presents as a follow-up for warts and acne. The patient was last seen in the dermatology clinic on 3/12/19 when 0.3 ml of candida antigen was injected into 3 warts on her right foot. She also started tretinoin 0.025% cream and benzoyl peroxide wash at that time for her acne. Today she reports that her acne has not gotten better. She doesn't feel like the doxycyline has been doing anything. She also stopped the tretinoin because it was way too drying. She gets deeper, painful pimples every few days. The acne has worsened on her back. Her wart doesn't seem to have changed either. She feels discouraged that neither of these problems have improved. The patient is otherwise feeling well. There are no other skin concerns at this time.      Past Medical History:   Patient Active Problem List   Diagnosis     Suicidal ideation     Past Medical History:   Diagnosis Date     Depressive disorder      Scoliosis      Past  Surgical History:   Procedure Laterality Date     HC TOOTH EXTRACTION W/FORCEP         Social History:   reports that she has never smoked. She has never used smokeless tobacco. She reports that she does not drink alcohol or use drugs. She uses alcohol socially.     Family History:  Family History   Problem Relation Age of Onset     Endocrine Disease Mother         hypothyroidism     C.A.D. Paternal Grandfather          MI     Prostate Cancer Maternal Grandfather      Depression Maternal Grandmother      Lipids Maternal Grandmother      Melanoma No family hx of      Skin Cancer No family hx of        Medications:  Current Outpatient Medications   Medication Sig Dispense Refill     ammonium lactate (LAC-HYDRIN) 12 % external cream Apply topically 2 times daily as needed for dry skin 385 g 3     cimetidine (TAGAMET) 800 MG tablet Take 1 tablet (800 mg) by mouth At Bedtime 90 tablet 1     doxycycline monohydrate (MONODOX) 100 MG capsule Take 1 capsule (100 mg) by mouth daily 90 capsule 0     etonogestrel (IMPLANON/NEXPLANON) 68 MG IMPL 1 each by Subdermal route once       gabapentin (NEURONTIN) 100 MG capsule Take 1 capsule (100 mg) by mouth 3 times daily 90 capsule 0     lamoTRIgine (LAMICTAL) 100 MG tablet Take 2 tablets (200 mg) by mouth daily 60 tablet 0     spironolactone (ALDACTONE) 100 MG tablet Take 1 tablet (100 mg) by mouth daily 90 tablet 1     lamoTRIgine (LAMICTAL) 100 MG tablet Take 3 tablets (300 mg) by mouth daily       tretinoin (RETIN-A) 0.025 % external cream Use every night to chest and upper back. (Patient not taking: Reported on 2019) 45 g 11       No Known Allergies    Review of Systems:  -Constitutional: The patient denies fatigue, fevers, chills, unintended weight loss, and night sweats.  -Skin: As above in HPI. No additional skin concerns.  -Denies any plan for self harm. Has chronic depression.     Physical exam:  Vitals: /73 (BP Location: Right arm, Patient Position: Sitting,  Cuff Size: Adult Regular)   Pulse 86   GEN: This is a well developed, well-nourished female in no acute distress, in a pleasant mood.    SKIN: Focused examination of the face, neck , chest, upper back and right foot was performed.  - 11 verrucous papules with thrombosed capillaries interrupting dermatoglyphics on the right planter foot, some of them near the 4th metatarsal that are in a mosaic pattern.   - Numerous closed comedones to face and back.   - Few inflammatory papules on her upper back.  - Open comedones clustered on central chest. Some closed comedones on her upper back.  - No other lesions of concern on areas examined.       Impression/Plan:  1.  Verruca plantaris, right plantar foot s/p cryotherapy, cantharone, candida     Sites were pared with a dermablade.        Continue to use OTC salicylic acid treatments after lesions are non tender.     Recommended zinc supplementation and cimetidine 800 mg qhs.     2.  Acne vulgaris, flaring while on doxycyline.     Start spironolactone 100 daily as patient is still experiencing break outs with current regimen.(Start with 50 mg daily for the first week)     Side effects of spironolactone discussed including postural hypotension, increased frequency of urination, breast tenderness, menstrual spotting, cardiac arrythmias and the need for contraception due to fetal feminization.      Baseline blood pressure, potassium, and BUN/Cr obtained. Discussed with patient that medication will need to be stopped if pregnancy is desired. Had a CMP in January 2019 with normal findings.    Continue BPO wash, apply to affected areas on chest 2-3 times weekly.     Restart tretinoin 0.025% cream. Recommended decrease frequency of use or apply only to forehead/back to avoid excessive dryness. Pt can also mix with moisturizer.   3. Depression. Pt has an elevated depression screening score.   She denies any plans for self harm and has a psychiatrist and therapist. She plans to f/u  with psychiatrist for mediation check. Pt believes she is stable.     Follow-up in 3 months, earlier for new or changing lesions.       Staff Involved:  Staff Only    Scribe Disclosure:  I, Gen Rivera, am serving as a scribe to document services personally performed by Vidya De Luna PA-C, based on data collection and the provider's statements to me.     Provider Disclosure:   The documentation recorded by the scribe accurately reflects the services I personally performed and the decisions made by me.    All risks, benefits and alternatives were discussed with patient.  Patient is in agreement and understands the assessment and plan.  All questions were answered.  Sun Screen Education was given.   Return to Clinic in 3 months or sooner as needed.   Vidya De Luna PA-C   [facility]                           Again, thank you for allowing me to participate in the care of your patient.      Sincerely,    Vidya De Luna PA-C

## 2019-05-29 NOTE — LETTER
Date:Marielena 3, 2019      Patient was self referred, no letter generated. Do not send.        [facility] Health Information

## 2019-05-30 ENCOUNTER — TELEPHONE (OUTPATIENT)
Dept: PSYCHIATRY | Facility: CLINIC | Age: 24
End: 2019-05-30

## 2019-05-30 RX ORDER — LAMOTRIGINE 100 MG/1
300 TABLET ORAL DAILY
COMMUNITY
Start: 2019-05-30 | End: 2019-06-11

## 2019-05-30 NOTE — TELEPHONE ENCOUNTER
Spoke with Hanna regarding efficacy of Lamictal 200mg.  She states no improvement in symptoms and no side effects. No rash observed. Agreed to increase dose to 300mg daily.  She does not need new order sent to pharmacy today as she just filled medication last week.  Hanna states she will call clinic when needs refill.

## 2019-06-01 DIAGNOSIS — L70.0 ACNE VULGARIS: ICD-10-CM

## 2019-06-04 DIAGNOSIS — F39 MOOD DISORDER (H): ICD-10-CM

## 2019-06-04 RX ORDER — DOXYCYCLINE 100 MG/1
CAPSULE ORAL
Qty: 90 CAPSULE | Refills: 0 | OUTPATIENT
Start: 2019-06-04

## 2019-06-05 NOTE — TELEPHONE ENCOUNTER
Medication requested: gabapentin (NEURONTIN) 100 MG capsule  Last refilled: 3/21/19  Qty: 90      Last seen: 5/16/19  RTC: 5 weeks  Cancel: 0  No-show: 0  Next appt: 7/2/19    Refill decision:   Refill pended and routed to the provider for review/determination due to   Note from 5/16/19 not complete

## 2019-06-06 RX ORDER — GABAPENTIN 100 MG/1
100 CAPSULE ORAL 3 TIMES DAILY PRN
Qty: 90 CAPSULE | Refills: 0 | Status: SHIPPED | OUTPATIENT
Start: 2019-06-06 | End: 2019-07-02

## 2019-06-11 ENCOUNTER — MYC REFILL (OUTPATIENT)
Dept: OTHER | Age: 24
End: 2019-06-11

## 2019-06-11 DIAGNOSIS — F39 MOOD DISORDER (H): ICD-10-CM

## 2019-06-11 ASSESSMENT — PATIENT HEALTH QUESTIONNAIRE - PHQ9: SUM OF ALL RESPONSES TO PHQ QUESTIONS 1-9: 20

## 2019-06-13 DIAGNOSIS — F39 MOOD DISORDER (H): ICD-10-CM

## 2019-06-13 RX ORDER — LAMOTRIGINE 100 MG/1
300 TABLET ORAL DAILY
Qty: 90 TABLET | Refills: 0 | Status: SHIPPED | OUTPATIENT
Start: 2019-06-13 | End: 2019-06-13

## 2019-06-13 RX ORDER — LAMOTRIGINE 100 MG/1
300 TABLET ORAL DAILY
Qty: 90 TABLET | Refills: 0 | Status: SHIPPED | OUTPATIENT
Start: 2019-06-13 | End: 2019-06-14

## 2019-06-14 ENCOUNTER — TELEPHONE (OUTPATIENT)
Dept: PSYCHIATRY | Facility: CLINIC | Age: 24
End: 2019-06-14

## 2019-06-14 DIAGNOSIS — F39 MOOD DISORDER (H): ICD-10-CM

## 2019-06-14 RX ORDER — LAMOTRIGINE 100 MG/1
300 TABLET ORAL DAILY
Qty: 270 TABLET | Refills: 0 | Status: SHIPPED | OUTPATIENT
Start: 2019-06-14 | End: 2019-07-02

## 2019-06-14 NOTE — TELEPHONE ENCOUNTER
Writer received incoming call from patient [phone]. Relayed the concern regarding 90 day supply needed for insurance for Lamotrigine. Patient agreed with the plan.     No further action needed by this writer

## 2019-06-14 NOTE — TELEPHONE ENCOUNTER
Post reviewing the chart Lamotrigine 100 mg- take 3 tabs daily refill sent on 6/13/19 for 30 day supply. Writer placed a call to pharmacy at [phone] and confirmed with the pharmacist refill was received for 30 day supply but the insurance will not cover a 30 day supply. Requesting this writer to sent in a 90 day supply.     Will route to provider

## 2019-06-14 NOTE — TELEPHONE ENCOUNTER
[facility] Call Center    Phone Message    May a detailed message be left on voicemail: yes    Reason for Call: Medication Question or concern regarding medication   Prescription Clarification  Name of Medication: lamotrigine 100mg  Prescribing Provider: HERRERA Herrera CNP   Pharmacy: [facility] [id number] [place] [facility] - [place], [place] - [address] [place] [address]   What on the order needs clarification? Patient called because the pharmacy told her they haven't received her refill. The patient's chart shows the medication was received by the pharmacy on 6/13/19 at 4:40pm. Writer confirmed with the patient that we have the correct pharmacy on file. She needs the medication today because she is going out of town and does not have any left.        Action Taken: Message routed to:  Other: Nursing pool

## 2019-06-14 NOTE — TELEPHONE ENCOUNTER
Medication Question (Trouble with refill)      Norberto Pal, APRN CNP  You 26 minutes ago (12:40 PM)      We can authorize 90 day supply but I'd prefer not to as I may adjust at next appointment.  Not sure how to get around that.  I guess if it needs to be done, it needs to be done.  Could you call Hanna and let her know of the issue?     Yes...she is taking 300mg daily     Thanks     Norberto    Routing comment      Writer placed a call to pharmacy and notified the medication was sent in as a 30 day supply due to plan on changing the medication. Per pharmacist, patient would have to pay out of pocket. Writer agreed to sent in a 90 day supply.      90 day supply refilled per providers approval   Med tab changed to reflect this     Writer placed a call to patient at [phone]. No answer at number provided. LVM, requesting a call back. Clinic number provided.

## 2019-06-17 DIAGNOSIS — F39 MOOD DISORDER (H): ICD-10-CM

## 2019-06-19 RX ORDER — LAMOTRIGINE 100 MG/1
TABLET ORAL
Qty: 60 TABLET | Refills: 0 | OUTPATIENT
Start: 2019-06-19

## 2019-07-02 ENCOUNTER — OFFICE VISIT (OUTPATIENT)
Dept: PSYCHIATRY | Facility: CLINIC | Age: 24
End: 2019-07-02
Attending: NURSE PRACTITIONER
Payer: COMMERCIAL

## 2019-07-02 VITALS — DIASTOLIC BLOOD PRESSURE: 78 MMHG | SYSTOLIC BLOOD PRESSURE: 119 MMHG | HEART RATE: 86 BPM | WEIGHT: 200.2 LBS

## 2019-07-02 DIAGNOSIS — F39 MOOD DISORDER (H): ICD-10-CM

## 2019-07-02 PROCEDURE — G0463 HOSPITAL OUTPT CLINIC VISIT: HCPCS | Mod: ZF

## 2019-07-02 RX ORDER — GABAPENTIN 300 MG/1
300 CAPSULE ORAL 3 TIMES DAILY PRN
Qty: 90 CAPSULE | Refills: 0 | Status: SHIPPED | OUTPATIENT
Start: 2019-07-02 | End: 2019-08-30

## 2019-07-02 ASSESSMENT — PAIN SCALES - GENERAL: PAINLEVEL: NO PAIN (0)

## 2019-07-02 NOTE — PROGRESS NOTES
"  Psychiatry Clinic Progress Note                                                                   Hanna Berg is a 24 year old female who returns to the clinic for follow-up care.  Therapist: Jo Ann @ [facility]  PCP: No Ref-Primary, Physician  Other Providers: None    Pertinent Background:  See previous notes.  Psych critical item history includes suicide attempt [single], suicidal ideation, SIB [cutting], mutiple psychotropic trials, psych hosp (3-5) and TMS    .     Interim History                                                                                                        4, 4     The patient is a good historian, reports good treatment adherence and was last seen 5/16/19.  Since the last visit, Hanna reports she is \"doing.\"  She stopped taking Lamictal due to lack of efficacy and nausea.  She missed 3 doses and did not want taper but to original dose.  Wants to try non-pharmalogical interventions.  Has completed DBT in past and found somewhat helpful.  Her current therapist is DBT therapist and continues to push various DBT skills.  Hanna plans to move to either [place] or [place] in October.  Gabapentin described as somewhat effective and has been taking 300mg TID.  Will continue to take as needed.  Awaiting call back from Treatment Resistant Depression Team    5/16/19: Hanna reports she is \"not great.\"  Increased Lamictal to 200mg last week with no benefit.  No concerns with side effects or rash.  She has been more active and spending more time outside.  Also has been spending more times with friends.  Has been meeting friends at [employer] and happy hour 3 times per week.  Hanna experiences respite from her depression and SI when she is with friends.  The structure her job provides also helps manage SI.  Continues to endorse suicidal ideation without intent or plan.  Future oriented as she has plans with parents at end of month.  Hanna laughingly stated \"it would be fucking " "rude if they came up here and I was dead.\"   Sleep is described as \"fine.\"  Recommended she take Gabapentin an hour before bedtime to help with sleep.  Contracted for safety until next visit in 5 weeks    4/18/19: Hanna reports she obtained a full-time job as an  at [employer].  Having a schedule has been helpful.  She has tolerated Lamictal without side effects.  Unfortunately, Hanna stopped seeing her therapist due to provider having medical issue. She is scheduled to see another therapist.  Affect quite bright today.  Continues to experience passive suicidal ideation.  No plan or intent.  Expresses interest in TRD team.  Gabapentin has been helpful for acute anxiety.  Hanna has not been taking scheduled so decided to use as PRN.    Previous medication trials (none beneficial):   Wellbutrin  Sertraline  Trintellix  Lexapro  Celexa  Viibryd  Pristiq  Effexor  Cymbalta  Selegiline  Lithium (briefly helped with suicidal ideation but stopped being effective after 2 months)  Also completed TMS at [facility] in 2015.  Not effective    Recent Symptoms:   Depression:  suicidal ideation without plan, without intent, depressed mood, anhedonia, low energy, insomnia, appetite changes, poor concentration /memory, feeling worthless and feeling hopeless  Elevated:  none  Psychosis:  none  Anxiety:  nervous/overwhelmed  Panic Attack:  none  Trauma Related:  none     Recent Substance Use:  Alcohol- yes, 3 times per week.  2-5 drinks.  Occasionally to intoxication. , Tobacco- no , Caffeine- minimal, Opioids- no    Narcan Kit- N/A , Cannabis- yes, twice in past year  and Other Illicit Drugs-none          Social/ Family History                                  [per patient report]                                 1ea,1ea   FINANCIAL SUPPORT- unemployed.  recent college graduate       CHILDREN- None       LIVING SITUATION- Lives alone in apartment in [place]      LEGAL- None  EARLY HISTORY/ " EDUCATION- Grew up in [place].  Graduated high school.  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 [employer]  SOCIAL/ SPIRITUAL SUPPORT- parents, brothers, 2 close friends       TRAUMA HISTORY (self-report)- see history  FEELS SAFE AT HOME- Yes  FAMILY HISTORY-  Brother with anxiety      Medical / Surgical History                                                                                                                  Patient Active Problem List   Diagnosis     Suicidal ideation       Past Surgical History:   Procedure Laterality Date     HC TOOTH EXTRACTION W/FORCEP          Medical Review of Systems                                                                                                    2,10   The remainder of the review of systems is noncontributory  Allergy                                Patient has no known allergies.  Current Medications                                                                                                       Current Outpatient Medications   Medication Sig Dispense Refill     ammonium lactate (LAC-HYDRIN) 12 % external cream Apply topically 2 times daily as needed for dry skin (Patient not taking: Reported on 7/2/2019) 385 g 3     cimetidine (TAGAMET) 800 MG tablet Take 1 tablet (800 mg) by mouth At Bedtime (Patient not taking: Reported on 7/2/2019) 90 tablet 1     etonogestrel (IMPLANON/NEXPLANON) 68 MG IMPL 1 each by Subdermal route once       gabapentin (NEURONTIN) 100 MG capsule Take 1 capsule (100 mg) by mouth 3 times daily as needed (anxiety) (Patient not taking: Reported on 7/2/2019) 90 capsule 0     lamoTRIgine (LAMICTAL) 100 MG tablet Take 3 tablets (300 mg) by mouth daily (Patient not taking: Reported on 7/2/2019) 270 tablet 0     lamoTRIgine (LAMICTAL) 100 MG tablet Take 2 tablets (200 mg) by mouth daily (Patient not taking: Reported on 7/2/2019) 60 tablet 0     spironolactone (ALDACTONE) 100 MG tablet Take 1 tablet (100 mg) by mouth daily (Patient not  taking: Reported on 7/2/2019) 90 tablet 1     tretinoin (RETIN-A) 0.025 % external cream Use every night to chest and upper back. (Patient not taking: Reported on 5/29/2019) 45 g 11     Vitals                                                                                                                       3, 3   /78   Pulse 86   Wt 90.8 kg (200 lb 3.2 oz)    Mental Status Exam                                                                                    9, 14 cog gs     Alertness: alert  and oriented  Appearance: casually groomed  Behavior/Demeanor: cooperative, pleasant and calm, with good  eye contact   Speech: normal  Language: intact  Psychomotor: normal or unremarkable  Mood: depressed  Affect: full range; was congruent to mood; was congruent to content  Thought Process/Associations: unremarkable  Thought Content:  Reports suicidal ideation without plan; without intent [details in Interim History];  Denies violent ideation  Perception:  Reports none;  Denies auditory hallucinations and visual hallucinations  Insight: good  Judgment: adequate for safety  Cognition: (6) does  appear grossly intact; formal cognitive testing was not done  Gait/Station and/or Muscle Strength/Tone: unremarkable    Labs and Data                                                                                                                 Rating Scales:    PHQ9    PHQ9 Today:  21  PHQ-9 SCORE 3/21/2019 5/16/2019 5/29/2019   PHQ-9 Total Score 21 20 21         Diagnosis and Assessment                                                                             m2, h3     Today the following issues were addressed:    1) Unspecified Mood Disorder  2) Generalized Anxiety Disorder  3) Borderline Personality Disorder (Hx)     [place] Prescription Monitoring Program [] was not checked today:  not taking controlled medications      Plan                                                                                                                     m2, h3      Discontinue Lamictal   Continue Gabapentin 300mg up to 3 times per day as needed for anxiety.  Hanna increased dose      2) Therapy  Continue with current therapist     RTC: requested 5 weeks due to copays.    CRISIS NUMBERS:   Provided routinely in AVS.    Treatment Risk Statement:  The patient understands the risks, benefits, adverse effects and alternatives. Agrees to treatment with the capacity to do so. No medical contraindications to treatment. Agrees to call clinic for any problems. The patient understands to call 911 or go to the nearest ED if life threatening or urgent symptoms occur.        PROVIDER:  HERRERA Arthur CNP

## 2019-07-06 DIAGNOSIS — F39 MOOD DISORDER (H): ICD-10-CM

## 2019-07-09 ENCOUNTER — TELEPHONE (OUTPATIENT)
Dept: PSYCHIATRY | Facility: CLINIC | Age: 24
End: 2019-07-09

## 2019-07-09 DIAGNOSIS — F39 MOOD DISORDER (H): ICD-10-CM

## 2019-07-09 RX ORDER — GABAPENTIN 100 MG/1
CAPSULE ORAL
Qty: 90 CAPSULE | Refills: 0 | OUTPATIENT
Start: 2019-07-09

## 2019-07-09 NOTE — TELEPHONE ENCOUNTER
received incoming phone call from the pt. She reports her insurance will not cover more than one cap per day of Fetzima. She is requesting two separate prescriptions for 20 mg and 40 mg (eventual dose). [facility] agreed to send two separate prescriptions reflecting this.

## 2019-07-12 ENCOUNTER — TELEPHONE (OUTPATIENT)
Dept: PSYCHIATRY | Facility: CLINIC | Age: 24
End: 2019-07-12

## 2019-07-12 NOTE — TELEPHONE ENCOUNTER
[facility] Call Center    Phone Message    May a detailed message be left on voicemail: yes    Reason for Call: Other: Pt's Fetzima prescription doesn't have enough coverage through her insurance. She is being charged over 100 dollars fo the prescription, which is too expensive for her to pay, so she wanted to notify Norberto Pal to discuss options.      Action Taken: Other: [facility] Psychiatry Pool

## 2019-07-15 ENCOUNTER — OFFICE VISIT (OUTPATIENT)
Dept: PSYCHIATRY | Facility: CLINIC | Age: 24
End: 2019-07-15
Payer: COMMERCIAL

## 2019-07-15 VITALS — DIASTOLIC BLOOD PRESSURE: 72 MMHG | HEART RATE: 65 BPM | SYSTOLIC BLOOD PRESSURE: 111 MMHG | WEIGHT: 196.2 LBS

## 2019-07-15 DIAGNOSIS — F33.1 MAJOR DEPRESSIVE DISORDER, RECURRENT EPISODE, MODERATE (H): Primary | ICD-10-CM

## 2019-07-15 ASSESSMENT — PAIN SCALES - GENERAL: PAINLEVEL: NO PAIN (0)

## 2019-07-15 ASSESSMENT — PATIENT HEALTH QUESTIONNAIRE - PHQ9: SUM OF ALL RESPONSES TO PHQ QUESTIONS 1-9: 20

## 2019-07-15 NOTE — TELEPHONE ENCOUNTER
Writer called pt's pharmacy to gather more information. The staff member confirmed that the medication is approved with pt's insurance, but each dose of the medication will still cost over $100. Appears pt would like to try another medication. She also has an appt with Dr. Sauceda today. Message routed to provider.
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
## 2019-07-17 ENCOUNTER — TELEPHONE (OUTPATIENT)
Dept: PSYCHIATRY | Facility: CLINIC | Age: 24
End: 2019-07-17

## 2019-07-17 ENCOUNTER — OFFICE VISIT (OUTPATIENT)
Dept: PSYCHIATRY | Facility: CLINIC | Age: 24
End: 2019-07-17
Payer: COMMERCIAL

## 2019-07-17 VITALS — DIASTOLIC BLOOD PRESSURE: 75 MMHG | HEART RATE: 90 BPM | WEIGHT: 196.3 LBS | SYSTOLIC BLOOD PRESSURE: 119 MMHG

## 2019-07-17 DIAGNOSIS — F33.1 MAJOR DEPRESSIVE DISORDER, RECURRENT EPISODE, MODERATE (H): Primary | ICD-10-CM

## 2019-07-17 ASSESSMENT — PAIN SCALES - GENERAL: PAINLEVEL: NO PAIN (0)

## 2019-07-17 NOTE — TELEPHONE ENCOUNTER
Writer sent EDDIE via fax to the number provided, and subsequently spoke with Ms. Vega via phone. Ms. Vega discussed the modalities used in session, length of tx, and the extent to which trauma processing was a part of the psychotherapy.

## 2019-07-17 NOTE — TELEPHONE ENCOUNTER
Writer called to identify a fax number or other means of sharing EDDIE prior to consult. Writer left voicemail and provided name and phone number.

## 2019-07-19 ENCOUNTER — OFFICE VISIT (OUTPATIENT)
Dept: PSYCHIATRY | Facility: CLINIC | Age: 24
End: 2019-07-19
Payer: COMMERCIAL

## 2019-07-19 VITALS — DIASTOLIC BLOOD PRESSURE: 75 MMHG | WEIGHT: 196 LBS | SYSTOLIC BLOOD PRESSURE: 120 MMHG | HEART RATE: 71 BPM

## 2019-07-19 DIAGNOSIS — F33.2 SEVERE EPISODE OF RECURRENT MAJOR DEPRESSIVE DISORDER, WITHOUT PSYCHOTIC FEATURES (H): Primary | ICD-10-CM

## 2019-07-19 RX ORDER — PHENELZINE SULFATE 15 MG/1
TABLET ORAL
Qty: 45 TABLET | Refills: 2 | Status: SHIPPED | OUTPATIENT
Start: 2019-07-19 | End: 2019-08-06

## 2019-07-19 ASSESSMENT — PATIENT HEALTH QUESTIONNAIRE - PHQ9: SUM OF ALL RESPONSES TO PHQ QUESTIONS 1-9: 20

## 2019-07-19 NOTE — TELEPHONE ENCOUNTER
Would you like me to join your appointment with the patient at 2:30 to discuss her therapist's recommendation? I could also help her find an EMDR provider if she is interested.

## 2019-07-19 NOTE — PATIENT INSTRUCTIONS
- Start metformin to help prevent weight gain: take 500mg twice daily with food  - Start Nardil: Take 15mg in the morning x 1 day. THEN increase to 15mg twice daily for 3 days. THEN increase to 30mg in the morning and 15mg at noon. After 7 days on this medication, call clinic to let us know how this is going (sooner if needed)  - Do not start any new medications without first consulting with us due to potential interactions    - Return to clinic in 6 wks
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
## 2019-07-30 ASSESSMENT — PATIENT HEALTH QUESTIONNAIRE - PHQ9: SUM OF ALL RESPONSES TO PHQ QUESTIONS 1-9: 21

## 2019-08-06 ENCOUNTER — OFFICE VISIT (OUTPATIENT)
Dept: PSYCHIATRY | Facility: CLINIC | Age: 24
End: 2019-08-06
Attending: NURSE PRACTITIONER
Payer: COMMERCIAL

## 2019-08-06 ENCOUNTER — CARE COORDINATION (OUTPATIENT)
Dept: PSYCHIATRY | Facility: CLINIC | Age: 24
End: 2019-08-06

## 2019-08-06 VITALS — HEART RATE: 89 BPM | DIASTOLIC BLOOD PRESSURE: 77 MMHG | WEIGHT: 197.6 LBS | SYSTOLIC BLOOD PRESSURE: 114 MMHG

## 2019-08-06 DIAGNOSIS — F33.2 SEVERE EPISODE OF RECURRENT MAJOR DEPRESSIVE DISORDER, WITHOUT PSYCHOTIC FEATURES (H): ICD-10-CM

## 2019-08-06 DIAGNOSIS — F33.2 SEVERE EPISODE OF RECURRENT MAJOR DEPRESSIVE DISORDER, WITHOUT PSYCHOTIC FEATURES (H): Primary | ICD-10-CM

## 2019-08-06 PROCEDURE — G0463 HOSPITAL OUTPT CLINIC VISIT: HCPCS | Mod: ZF

## 2019-08-06 RX ORDER — PHENELZINE SULFATE 15 MG/1
TABLET ORAL
Qty: 90 TABLET | Refills: 0 | Status: SHIPPED | OUTPATIENT
Start: 2019-08-06 | End: 2019-08-30

## 2019-08-06 ASSESSMENT — PAIN SCALES - GENERAL: PAINLEVEL: NO PAIN (0)

## 2019-08-06 NOTE — PROGRESS NOTES
Pharmacy reported that it was too soon to fill until 8/11.  Writer ordered med with full dose and it was processed successfully by the pharmacy.

## 2019-08-06 NOTE — PROGRESS NOTES
"  Psychiatry Clinic Progress Note                                                                   Hanna Berg is a 24 year old female who returns to the clinic for follow-up care.  Therapist: Jo Ann @ [facility]  PCP: No Ref-Primary, Physician  Other Providers: None    Pertinent Background:  See previous notes.  Psych critical item history includes suicide attempt [single], suicidal ideation, SIB [cutting], mutiple psychotropic trials, psych hosp (3-5) and TMS    .     Interim History                                                                                                        4, 4     The patient is a good historian, reports good treatment adherence and was last seen 7/2/19.  Since the last visit,  Hanna reports she is feeling \"not awesome.\"  She did complete assessment with TRD team and Nardil was started.  She is also experiencing increased psychosocial stressors including work contract ending and lease for apartment also ending.  Hanna also reports the ending of both lease and job as well as less interest in moving to either [place] or [place] has led to increased SI.  No plan or intent.  Since starting Nardil, Hanna also reports decreased appetite and increased daytime sedation.  She also expresses frustration with food limitations.      7/2/19: Hanna reports she is \"doing.\"  She stopped taking Lamictal due to lack of efficacy and nausea.  She missed 3 doses and did not want taper but to original dose.  Wants to try non-pharmalogical interventions.  Has completed DBT in past and found somewhat helpful.  Her current therapist is DBT therapist and continues to push various DBT skills.  Hanna plans to move to either [place] or [place] in October.  Gabapentin described as somewhat effective and has been taking 300mg TID.  Will continue to take as needed.  Awaiting call back from Treatment Resistant Depression Team    5/16/19: Hanna reports she is \"not great.\"  Increased Lamictal to " "200mg last week with no benefit.  No concerns with side effects or rash.  She has been more active and spending more time outside.  Also has been spending more times with friends.  Has been meeting friends at [employer] 3 times per week.  Hanna experiences respite from her depression and SI when she is with friends.  The structure her job provides also helps manage SI.  Continues to endorse suicidal ideation without intent or plan.  Future oriented as she has plans with parents at end of month.  Hanna laughingly stated \"it would be fucking rude if they came up here and I was dead.\"   Sleep is described as \"fine.\"  Recommended she take Gabapentin an hour before bedtime to help with sleep.  Contracted for safety until next visit in 5 weeks    4/18/19: Hanna reports she obtained a full-time job as an  at [employer].  Having a schedule has been helpful.  She has tolerated Lamictal without side effects.  Unfortunately, Hanna stopped seeing her therapist due to provider having medical issue. She is scheduled to see another therapist.  Affect quite bright today.  Continues to experience passive suicidal ideation.  No plan or intent.  Expresses interest in TRD team.  Gabapentin has been helpful for acute anxiety.  Hanna has not been taking scheduled so decided to use as PRN.    Previous medication trials (none beneficial):   Wellbutrin  Sertraline  Trintellix  Lexapro  Celexa  Viibryd  Pristiq  Effexor  Cymbalta  Selegiline  Lithium (briefly helped with suicidal ideation but stopped being effective after 2 months)  Also completed TMS at [facility] in 2015.  Not effective    Recent Symptoms:   Depression:  suicidal ideation without plan, without intent, depressed mood, anhedonia, low energy, insomnia, appetite changes, poor concentration /memory, feeling worthless and feeling hopeless  Elevated:  none  Psychosis:  none  Anxiety:  nervous/overwhelmed  Panic Attack:  " none  Trauma Related:  none     Recent Substance Use:  Alcohol- yes, 3 times per week.  2-5 drinks.  Occasionally to intoxication. , Tobacco- no , Caffeine- minimal, Opioids- no    Narcan Kit- N/A , Cannabis- yes, twice in past year  and Other Illicit Drugs-none          Social/ Family History                                  [per patient report]                                 1ea,1ea   FINANCIAL SUPPORT- unemployed.  recent college graduate       CHILDREN- None       LIVING SITUATION- Lives alone in apartment in [place]      LEGAL- None  EARLY HISTORY/ EDUCATION- Grew up in [place].  Graduated high school.  Recently graduated from [employer] in [employer]  SOCIAL/ SPIRITUAL SUPPORT- parents, brothers, 2 close friends       TRAUMA HISTORY (self-report)- see history  FEELS SAFE AT HOME- Yes  FAMILY HISTORY-  Brother with anxiety      Medical / Surgical History                                                                                                                  Patient Active Problem List   Diagnosis     Suicidal ideation       Past Surgical History:   Procedure Laterality Date     HC TOOTH EXTRACTION W/FORCEP          Medical Review of Systems                                                                                                    2,10   The remainder of the review of systems is noncontributory  Allergy                                Patient has no known allergies.  Current Medications                                                                                                       Current Outpatient Medications   Medication Sig Dispense Refill     cimetidine (TAGAMET) 800 MG tablet Take 1 tablet (800 mg) by mouth At Bedtime 90 tablet 1     etonogestrel (IMPLANON/NEXPLANON) 68 MG IMPL 1 each by Subdermal route once       gabapentin (NEURONTIN) 300 MG capsule Take 1 capsule (300 mg) by mouth 3 times daily as needed (anxiety) 90 capsule 0     metFORMIN (GLUCOPHAGE) 500 MG tablet Take  1 tablet (500 mg) by mouth 2 times daily (with meals) 60 tablet 1     phenelzine (NARDIL) 15 MG tablet Take 1 tablet for one day. Then 1 tablet twice daily for 3 days. Then 2 tablet in morning and 1 tablets around noon. 45 tablet 2     spironolactone (ALDACTONE) 100 MG tablet Take 1 tablet (100 mg) by mouth daily 90 tablet 1     ammonium lactate (LAC-HYDRIN) 12 % external cream Apply topically 2 times daily as needed for dry skin (Patient not taking: Reported on 7/2/2019) 385 g 3     levomilnacipran (FETZIMA ER) 40 MG 24 hr capsule Take 1 capsule (40 mg) by mouth daily (Patient not taking: Reported on 7/15/2019) 30 capsule 0     tretinoin (RETIN-A) 0.025 % external cream Use every night to chest and upper back. (Patient not taking: Reported on 5/29/2019) 45 g 11     Vitals                                                                                                                       3, 3   /77   Pulse 89   Wt 89.6 kg (197 lb 9.6 oz)   LMP  (LMP Unknown)    Mental Status Exam                                                                                    9, 14 cog gs     Alertness: alert  and oriented  Appearance: casually groomed  Behavior/Demeanor: cooperative, pleasant and calm, with good  eye contact   Speech: regular rate and rhythm  Language: intact  Psychomotor: normal or unremarkable  Mood: depressed  Affect: full range; was congruent to mood; was congruent to content  Thought Process/Associations: unremarkable  Thought Content:  Reports suicidal ideation without plan; without intent [details in Interim History];  Denies violent ideation  Perception:  Reports none;  Denies auditory hallucinations and visual hallucinations  Insight: good  Judgment: adequate for safety  Cognition: (6) does  appear grossly intact; formal cognitive testing was not done  Gait/Station and/or Muscle Strength/Tone: unremarkable    Labs and Data                                                                                                                  Rating Scales:    PHQ9    PHQ9 Today:  24  PHQ-9 SCORE 7/2/2019 7/15/2019 7/19/2019   PHQ-9 Total Score 21 20 20         Diagnosis and Assessment                                                                             m2, h3     Today the following issues were addressed:    1) Unspecified Mood Disorder  2) Generalized Anxiety Disorder  3) Borderline Personality Disorder (Hx)     [place] Prescription Monitoring [place] [] was not checked today:  not taking controlled medications      Plan                                                                                                                    m2, h3      Continue Nardil 30mg qam and 15mg at noon  Continue Gabapentin 300mg up to 3 times per day as needed for anxiety.  Hanna increased dose      2) Therapy  Continue with current therapist    3) TRD  Continue to meet with and follow recommendations of TRD provider     RTC: requested 5 weeks due to copays.    CRISIS NUMBERS:   Provided routinely in AVS.    Treatment Risk Statement:  The patient understands the risks, benefits, adverse effects and alternatives. Agrees to treatment with the capacity to do so. No medical contraindications to treatment. Agrees to call clinic for any problems. The patient understands to call 911 or go to the nearest ED if life threatening or urgent symptoms occur.        PROVIDER:  HERRERA Arthur CNP

## 2019-08-09 ENCOUNTER — TELEPHONE (OUTPATIENT)
Dept: PSYCHIATRY | Facility: CLINIC | Age: 24
End: 2019-08-09

## 2019-08-09 NOTE — TELEPHONE ENCOUNTER
Norberto Pal, HERRERA CNP  Anisa Donaldson RN   Cc: Cristin Crowder, [facility]; Nelly Brandon [facility]   Caller: Unspecified (Today,  3:14 PM)             Hi.  I'm not 100% sure but I believe most OTC cold medicines should be avoided.        Writer discussed the pt's question with the provider in person as well. Appears the pharmacists have left for the day. Provider suggested pt connect with her local pharmacist.    Called the pt. No answer. LVM with the above message, that she should ask her local pharmacist.

## 2019-08-09 NOTE — TELEPHONE ENCOUNTER
received incoming phone call from the pt at [phone]. She currently has a cold and was wondering what cold medication she can take with her MAOI medication. Nemesior agreed to ask the provider and pharmacists for further guidance and will then call the pt back.

## 2019-08-27 ENCOUNTER — MYC MEDICAL ADVICE (OUTPATIENT)
Dept: DERMATOLOGY | Facility: CLINIC | Age: 24
End: 2019-08-27

## 2019-08-28 DIAGNOSIS — F33.2 SEVERE EPISODE OF RECURRENT MAJOR DEPRESSIVE DISORDER, WITHOUT PSYCHOTIC FEATURES (H): ICD-10-CM

## 2019-08-30 ENCOUNTER — OFFICE VISIT (OUTPATIENT)
Dept: PSYCHIATRY | Facility: CLINIC | Age: 24
End: 2019-08-30
Payer: COMMERCIAL

## 2019-08-30 DIAGNOSIS — F33.2 SEVERE EPISODE OF RECURRENT MAJOR DEPRESSIVE DISORDER, WITHOUT PSYCHOTIC FEATURES (H): ICD-10-CM

## 2019-08-30 DIAGNOSIS — F39 MOOD DISORDER (H): ICD-10-CM

## 2019-08-30 RX ORDER — PHENELZINE SULFATE 15 MG/1
TABLET ORAL
Qty: 150 TABLET | Refills: 1 | Status: SHIPPED | OUTPATIENT
Start: 2019-08-30 | End: 2019-09-30

## 2019-08-30 RX ORDER — GABAPENTIN 300 MG/1
CAPSULE ORAL
Qty: 90 CAPSULE | Refills: 1 | Status: SHIPPED | OUTPATIENT
Start: 2019-08-30 | End: 2019-09-30

## 2019-08-30 ASSESSMENT — ANXIETY QUESTIONNAIRES
7. FEELING AFRAID AS IF SOMETHING AWFUL MIGHT HAPPEN: NOT AT ALL
5. BEING SO RESTLESS THAT IT IS HARD TO SIT STILL: MORE THAN HALF THE DAYS
6. BECOMING EASILY ANNOYED OR IRRITABLE: NOT AT ALL
3. WORRYING TOO MUCH ABOUT DIFFERENT THINGS: NEARLY EVERY DAY
4. TROUBLE RELAXING: NEARLY EVERY DAY
GAD7 TOTAL SCORE: 11
1. FEELING NERVOUS, ANXIOUS, OR ON EDGE: MORE THAN HALF THE DAYS
2. NOT BEING ABLE TO STOP OR CONTROL WORRYING: SEVERAL DAYS

## 2019-08-30 ASSESSMENT — PATIENT HEALTH QUESTIONNAIRE - PHQ9: SUM OF ALL RESPONSES TO PHQ QUESTIONS 1-9: 22
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
## 2019-08-30 NOTE — PATIENT INSTRUCTIONS
- stop metformin    - increase bedtime dose of gabapentin to 600mg    - increase Nardil: 30mg twice daily x 2 weeks, then 30mg in morning and 45mg in evening. Continue to adhere to dietary restrictions. Be careful with positional changes with this medication. Stay hydrated with non-sugary beverages.     - return to clinic in 1 month

## 2019-08-31 ASSESSMENT — ANXIETY QUESTIONNAIRES: GAD7 TOTAL SCORE: 11

## 2019-09-02 ENCOUNTER — MYC MEDICAL ADVICE (OUTPATIENT)
Dept: PSYCHIATRY | Facility: CLINIC | Age: 24
End: 2019-09-02

## 2019-09-02 RX ORDER — PHENELZINE SULFATE 15 MG/1
TABLET ORAL
Qty: 90 TABLET | Refills: 0 | OUTPATIENT
Start: 2019-09-02

## 2019-09-03 ENCOUNTER — MYC MEDICAL ADVICE (OUTPATIENT)
Dept: PSYCHIATRY | Facility: CLINIC | Age: 24
End: 2019-09-03

## 2019-09-03 ASSESSMENT — PATIENT HEALTH QUESTIONNAIRE - PHQ9: SUM OF ALL RESPONSES TO PHQ QUESTIONS 1-9: 24

## 2019-09-03 NOTE — TELEPHONE ENCOUNTER
Doris Sherwood MD Swanson, Melanie A, RN; Joann Sauceda MD   Phone Number: [phone]             Cris - Let's have Hanna back down by 15mg to a total daily dose of 45mg until Dr. Sauceda and I can connect on this.     Dr. Sauceda - I remember you mentioned BASHIR robison in a previous patient. Is that worth a trial here? Otherwise, are there any alternative interventions that you've had success with managing orthostasis?

## 2019-09-06 NOTE — TELEPHONE ENCOUNTER
Doris Sherwood MD Swanson, Melanie A, RN Cc: Joann Sauceda MD   Phone Number: [phone]             Naseem Lynch,     Just touched base with Dr. Sauceda. Let's see if she is willing to give BASHIR stockings as it can help reduce orthostasis if Nardil is the culprit. If she feels that her current level of lightheadedness improves with the stockings, she can then try increasing back to 60mg, which ideally we would get to for an effective dose. Otherwise, let's see if she would be willing to remain at 45mg for approximately another 3 weeks to more fully gauge response to Nardil.     - Eleazar

## 2019-09-16 RX ORDER — LAMOTRIGINE 100 MG/1
TABLET ORAL
Qty: 270 TABLET | Refills: 0 | OUTPATIENT
Start: 2019-09-16

## 2019-09-18 NOTE — TELEPHONE ENCOUNTER
-Writer spoke with Dr. Sauceda and relayed information regarding the Nardil.     -Per Dr. Sauceda patient should stop Nardil and come in and see Dr. Sherwood tomorrow in clinic.

## 2019-09-18 NOTE — TELEPHONE ENCOUNTER
-Writer called Hanna, informed her to stop the Nardil, and assisted her in scheduling an appointment with Dr. Sherwood in clinic tomorrow at 3pm

## 2019-09-18 NOTE — TELEPHONE ENCOUNTER
Naseem Lynch,    [facility] and [facility] for a resident retreat. Can we have her come in on Friday either before 10AM or after noon?    Thanks!  Eleazar

## 2019-09-18 NOTE — TELEPHONE ENCOUNTER
-Writer called in response to [employer] messages.  8-10 seconds of unawareness, per friends she was convulsing per her friends.  Per patient she is feeling fine now, but would like to stop the Nardil.

## 2019-09-20 ENCOUNTER — OFFICE VISIT (OUTPATIENT)
Dept: PSYCHIATRY | Facility: CLINIC | Age: 24
End: 2019-09-20
Payer: COMMERCIAL

## 2019-09-20 VITALS
HEIGHT: 67 IN | HEART RATE: 70 BPM | WEIGHT: 178.2 LBS | RESPIRATION RATE: 19 BRPM | SYSTOLIC BLOOD PRESSURE: 109 MMHG | BODY MASS INDEX: 27.97 KG/M2 | DIASTOLIC BLOOD PRESSURE: 71 MMHG

## 2019-09-20 DIAGNOSIS — F33.2 SEVERE EPISODE OF RECURRENT MAJOR DEPRESSIVE DISORDER, WITHOUT PSYCHOTIC FEATURES (H): Primary | ICD-10-CM

## 2019-09-20 ASSESSMENT — ANXIETY QUESTIONNAIRES
5. BEING SO RESTLESS THAT IT IS HARD TO SIT STILL: NEARLY EVERY DAY
1. FEELING NERVOUS, ANXIOUS, OR ON EDGE: NEARLY EVERY DAY
GAD7 TOTAL SCORE: 11
3. WORRYING TOO MUCH ABOUT DIFFERENT THINGS: SEVERAL DAYS
7. FEELING AFRAID AS IF SOMETHING AWFUL MIGHT HAPPEN: NOT AT ALL
2. NOT BEING ABLE TO STOP OR CONTROL WORRYING: MORE THAN HALF THE DAYS
4. TROUBLE RELAXING: MORE THAN HALF THE DAYS
7. FEELING AFRAID AS IF SOMETHING AWFUL MIGHT HAPPEN: NOT AT ALL
6. BECOMING EASILY ANNOYED OR IRRITABLE: NOT AT ALL
GAD7 TOTAL SCORE: 11
1. FEELING NERVOUS, ANXIOUS, OR ON EDGE: NEARLY EVERY DAY
6. BECOMING EASILY ANNOYED OR IRRITABLE: NOT AT ALL
5. BEING SO RESTLESS THAT IT IS HARD TO SIT STILL: NEARLY EVERY DAY
2. NOT BEING ABLE TO STOP OR CONTROL WORRYING: MORE THAN HALF THE DAYS
3. WORRYING TOO MUCH ABOUT DIFFERENT THINGS: SEVERAL DAYS

## 2019-09-20 ASSESSMENT — PATIENT HEALTH QUESTIONNAIRE - PHQ9: 5. POOR APPETITE OR OVEREATING: MORE THAN HALF THE DAYS

## 2019-09-20 ASSESSMENT — PAIN SCALES - GENERAL: PAINLEVEL: NO PAIN (0)

## 2019-09-20 ASSESSMENT — MIFFLIN-ST. JEOR: SCORE: 1595.94
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
## 2019-09-20 NOTE — PATIENT INSTRUCTIONS
- We discussed several options, including ECT, TMS, ketamine, and intensive outpatient treatment  - We will try to ask for records again from [facility]    - we will pursue a prior authorization for TMS and esketamine  - stop phenelzine, which you already have. This needs to washout for 2 weeks before you can resume a normal diet    - return to clinic on 09/30 as planned

## 2019-09-21 ENCOUNTER — MYC MEDICAL ADVICE (OUTPATIENT)
Dept: PSYCHIATRY | Facility: CLINIC | Age: 24
End: 2019-09-21

## 2019-09-21 ASSESSMENT — ANXIETY QUESTIONNAIRES: GAD7 TOTAL SCORE: 11

## 2019-09-23 ASSESSMENT — PATIENT HEALTH QUESTIONNAIRE - PHQ9: SUM OF ALL RESPONSES TO PHQ QUESTIONS 1-9: 23

## 2019-09-26 DIAGNOSIS — F39 MOOD DISORDER (H): ICD-10-CM

## 2019-09-28 DIAGNOSIS — F33.2 SEVERE EPISODE OF RECURRENT MAJOR DEPRESSIVE DISORDER, WITHOUT PSYCHOTIC FEATURES (H): ICD-10-CM

## 2019-09-30 ENCOUNTER — OFFICE VISIT (OUTPATIENT)
Dept: PSYCHIATRY | Facility: CLINIC | Age: 24
End: 2019-09-30
Payer: COMMERCIAL

## 2019-09-30 VITALS
DIASTOLIC BLOOD PRESSURE: 67 MMHG | HEART RATE: 78 BPM | TEMPERATURE: 97.6 F | WEIGHT: 180.6 LBS | BODY MASS INDEX: 27.37 KG/M2 | HEIGHT: 68 IN | SYSTOLIC BLOOD PRESSURE: 138 MMHG

## 2019-09-30 DIAGNOSIS — F39 MOOD DISORDER (H): ICD-10-CM

## 2019-09-30 RX ORDER — GABAPENTIN 100 MG/1
CAPSULE ORAL
Qty: 90 CAPSULE | Refills: 0 | OUTPATIENT
Start: 2019-09-30

## 2019-09-30 RX ORDER — GABAPENTIN 300 MG/1
CAPSULE ORAL
Qty: 90 CAPSULE | Refills: 1 | Status: SHIPPED | OUTPATIENT
Start: 2019-09-30 | End: 2019-10-16

## 2019-09-30 RX ORDER — PRAMIPEXOLE DIHYDROCHLORIDE 0.25 MG/1
TABLET ORAL
Qty: 60 TABLET | Refills: 1 | Status: SHIPPED | OUTPATIENT
Start: 2019-09-30 | End: 2019-10-25

## 2019-09-30 ASSESSMENT — PAIN SCALES - GENERAL: PAINLEVEL: NO PAIN (0)

## 2019-09-30 ASSESSMENT — MIFFLIN-ST. JEOR: SCORE: 1622.7

## 2019-09-30 ASSESSMENT — PATIENT HEALTH QUESTIONNAIRE - PHQ9: SUM OF ALL RESPONSES TO PHQ QUESTIONS 1-9: 23

## 2019-09-30 NOTE — PATIENT INSTRUCTIONS
- We are waiting to hear from insurance regarding TMS and esketamine  - For now, start pramipexole to treat depression: Take 0.25mg at bedtime x 3 days, THEN add 0.25mg during daytime x 3 days. Can continue to add 0.25mg every 3 days, alternating between day and night until reaching 1mg twice daily so long as you tolerate this.
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
## 2019-10-01 RX ORDER — PHENELZINE SULFATE 15 MG/1
TABLET ORAL
Qty: 150 TABLET | Refills: 1 | OUTPATIENT
Start: 2019-10-01

## 2019-10-02 DIAGNOSIS — F39 MOOD DISORDER (H): ICD-10-CM

## 2019-10-07 DIAGNOSIS — F33.2 SEVERE RECURRENT MAJOR DEPRESSION WITHOUT PSYCHOTIC FEATURES (H): Primary | ICD-10-CM

## 2019-10-08 ENCOUNTER — TELEPHONE (OUTPATIENT)
Dept: PSYCHIATRY | Facility: CLINIC | Age: 24
End: 2019-10-08

## 2019-10-08 RX ORDER — GABAPENTIN 100 MG/1
CAPSULE ORAL
Qty: 90 CAPSULE | Refills: 0 | OUTPATIENT
Start: 2019-10-08

## 2019-10-08 NOTE — TELEPHONE ENCOUNTER
Writer reviewed pt's chart. Appears gabapentin 300 mg was prescribed by Dr. Sherwood/Dr. Sauceda on 9/30. This is for gabapentin 300 mg QAM and 600 mg at bedtime.    Current RF request refused at this time.
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
## 2019-10-08 NOTE — TELEPHONE ENCOUNTER
Prior Authorization Retail Medication Request    Medication/Dose: Spravato 56 mg   ICD code (if different than what is on RX):  F33.2  Previously Tried and Failed:  See chart  Rationale:  See chart    Insurance Name:  [employer]  Insurance ID:  [id number]       Pharmacy Information (if different than what is on RX)  Name:  [facility]  Phone:  [phone]

## 2019-10-09 ENCOUNTER — TELEPHONE (OUTPATIENT)
Dept: PSYCHIATRY | Facility: CLINIC | Age: 24
End: 2019-10-09

## 2019-10-09 NOTE — TELEPHONE ENCOUNTER
-Writer returned call to [facility] and left message with information requested.  Clinic number provided should she have any other questions.

## 2019-10-09 NOTE — TELEPHONE ENCOUNTER
Called pharmacy for updated insurance information but they do not have any on file to process script. Pharmacy unable to process script to see if PA is actually required. Tried ID from note below however, [facility] cannot find the patient. Unable to submit PA without active pharmacy benefit coverage and claim submission from pharmacy.

## 2019-10-09 NOTE — TELEPHONE ENCOUNTER
----- Message from Scar Altamirano CMA sent at 10/8/2019  1:09 PM CDT -----  Regarding: TMS Auth  Lamar from [employer] called. She needs more information to process the auth.     1. medication dosage, start/end date and side effects.  2. Psychotherapy start/end date and frequency.     Call [phone] ext [phone]

## 2019-10-10 ENCOUNTER — MYC MEDICAL ADVICE (OUTPATIENT)
Dept: PSYCHIATRY | Facility: CLINIC | Age: 24
End: 2019-10-10

## 2019-10-10 DIAGNOSIS — F39 MOOD DISORDER (H): ICD-10-CM

## 2019-10-16 RX ORDER — GABAPENTIN 100 MG/1
100 CAPSULE ORAL 3 TIMES DAILY
Qty: 30 CAPSULE | Refills: 0 | Status: SHIPPED | OUTPATIENT
Start: 2019-10-16 | End: 2019-10-24

## 2019-10-16 NOTE — TELEPHONE ENCOUNTER
Doris Sherwood MD Swanson, Melanie A, RN Cc: Joann Sauceda MD   Phone Number: [phone]             I would also double check on why she wants to decrease as 100mg TID is a pretty low dose, and it might not be helpful for her anxiety/sleep. If she does want to go through wht the decrease, I would have her do a taper  by 300mg every week (I.e. 300mg BID x 1 week, then 100mg TID), especially as she has a history of seizures.

## 2019-10-16 NOTE — TELEPHONE ENCOUNTER
Doris Sherwood MD Swanson, Melanie A, RN   Caller: Unspecified (6 days ago,  9:33 PM)   Phone Number: [phone]             Yes we can just do 100mg TID then.     Thanks!   Eleazar Wolf

## 2019-10-16 NOTE — TELEPHONE ENCOUNTER
- Writer called and spoke with Hanna.  She reports that she has never taken 300 mg TID.  She has only taken 100 mg TID and then had a 300 mg prn dose.  Reports she would like to take 100 mg TID again.      -Will route to providers to follow up on this

## 2019-10-18 ENCOUNTER — OFFICE VISIT (OUTPATIENT)
Dept: PSYCHIATRY | Facility: CLINIC | Age: 24
End: 2019-10-18
Payer: COMMERCIAL

## 2019-10-18 VITALS
BODY MASS INDEX: 27.46 KG/M2 | DIASTOLIC BLOOD PRESSURE: 82 MMHG | HEART RATE: 89 BPM | WEIGHT: 180.6 LBS | SYSTOLIC BLOOD PRESSURE: 143 MMHG

## 2019-10-18 DIAGNOSIS — F33.1 MAJOR DEPRESSIVE DISORDER, RECURRENT EPISODE, MODERATE (H): Primary | ICD-10-CM

## 2019-10-18 ASSESSMENT — PATIENT HEALTH QUESTIONNAIRE - PHQ9: SUM OF ALL RESPONSES TO PHQ QUESTIONS 1-9: 20

## 2019-10-18 ASSESSMENT — PAIN SCALES - GENERAL: PAINLEVEL: NO PAIN (0)
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
## 2019-10-21 ENCOUNTER — ALLIED HEALTH/NURSE VISIT (OUTPATIENT)
Dept: PSYCHIATRY | Facility: CLINIC | Age: 24
End: 2019-10-21
Payer: COMMERCIAL

## 2019-10-21 ENCOUNTER — OFFICE VISIT (OUTPATIENT)
Dept: PSYCHIATRY | Facility: CLINIC | Age: 24
End: 2019-10-21
Payer: COMMERCIAL

## 2019-10-21 VITALS — SYSTOLIC BLOOD PRESSURE: 117 MMHG | HEART RATE: 80 BPM | DIASTOLIC BLOOD PRESSURE: 74 MMHG

## 2019-10-21 DIAGNOSIS — F33.1 MAJOR DEPRESSIVE DISORDER, RECURRENT EPISODE, MODERATE (H): Primary | ICD-10-CM

## 2019-10-21 DIAGNOSIS — F33.2 SEVERE RECURRENT MAJOR DEPRESSION WITHOUT PSYCHOTIC FEATURES (H): Primary | ICD-10-CM

## 2019-10-21 ASSESSMENT — PATIENT HEALTH QUESTIONNAIRE - PHQ9: SUM OF ALL RESPONSES TO PHQ QUESTIONS 1-9: 21

## 2019-10-21 NOTE — Clinical Note
Naseem Ventura,Dr. Sauceda has made an error asking you to schedule this patient on the [employer], whichever machine became first. When can she be treated in room 2 at 4PM? How long would that wait be before she can start? Can you please text Dr. Sauceda in the morning to discuss this.Thanks!Eleazar

## 2019-10-21 NOTE — PROGRESS NOTES
TMS Education     Hanna Berg MRN# [id number]  Age: 24 year old year old YOB: 1995        Patient is here in clinic for TMS education and consenting.  Patient will be starting TMS today on the [employer] Machine.  Writer and patient reviewed mechanics of TMS.  Discussed using magnetic pulses to stimulate and induce an electrical field inside the brain.  Discussed the difference between F8 and H1 coil.  Patient was able to verbalize understanding of this.  Discussed that the idea is that we are treating the DLPFC of the brain, as it has been shown by in studies that people who have depression have decreased activity in this part of the brain, the idea is that we stimulate this part of the brain to increase activity.       Reviewed processing of mapping and how visit today would go.  Encouraged patient to communicate with staff if treatment at anytime became painful.         Discussed side effects of TMS.  Side effects include headaches after treatment, hearing loss, and seizures.  Discussed ways that TMS Clinic helps with preventing these side effects.  Such as retesting motor thresholds weekly and having patient wear ear plugs.  Instructed patient that she can take OTC pain relievers such as tylenol or IBU if she has a headache after today's treatment.       Patient was able to ask questions regarding procedure.   Consent was signed by patient today.

## 2019-10-21 NOTE — PROGRESS NOTES
" [facility] TMS Program  [address], [address]  [place] [address]  TMS Procedure Note   Hanna Berg MRN# [id number]  Age: 24 year old year old YOB: 1995    Pre-Procedure:  History and Physical: Reviewed in medical record  Consent Signed by: Hanna Berg  On: 10/21/19    Clinical Narrative:  Experienced significant pain on first few trains at 100% MT. Tried reducing to 90% of MT but still could not tolerate. Reduced further to 50% MT. Tolerable at this energy level but \"not great.\" Dr. Sauceda discussed with patient possibility of 1 Hz inhibitory protocol vs Theta protocol. He will check in with clinic supervisor about timeline for this and get back to patient about this tomorrow.     Indications for TMS:  MDD, recurrent, severe; 4+ medication trials (from 2+ classes) ineffective; Psychotherapy ineffective.     Pre-Procedure Diagnosis:  MDD, recurrent, severe 296.33    Treatment Hx:  Treatment number this series: 1  Total lifetime treatment number: 1 + unclear number in 2015    No Known Allergies   /74 (BP Location: Right arm, Patient Position: Sitting, Cuff Size: Adult Regular)   Pulse 80       Pause for the Cause  Right patient:  Yes  Right procedure/correct coil:  Yes; rTMS; cpt 51194; H1 coil.   Earplugs in place:  Yes    Procedure  Patient was seated in procedure chair.  Identity and procedure was verified.  Ear plugs were placed in ears and patient-specific cap was placed on head and tightened appropriately.  Ruler locations were verified.  Bone conducting headphones were not used. Coil was placed at 0 - eyebrows - 16 and stimulator was set to 58% (100% of MT).  Initial train was NOT well tolerated so stimulator was adjusted to 29% (50% of MT). 55 trains were delivered.  Pt was able to tolerate treatment at lower MT with no motor movement.    Motor Threshold Determination  Distance from nasion to inion: 37  MT 1: 0 - 7 - 16 @ 58% on 10/21/19    Stimulation " Parameters  Frequency: 18 Hz     Train duration: 2 sec  Total pulses delivered: 1980  Inter-train interval: 20 sec  Tx Loc: 0 - eyebrow  - 16  Energy: 29% (50% MT)  Trains: 55 trains      Rating Scales:  Date MADRS IDS-SR PHQ-9   10/21/19 28 33 21    --       Psychiatric Examination  Appearance:  awake, alert  Attitude:  cooperative  Eye Contact:  good  Mood:  depressed  Affect:  mood congruent  Speech:  clear, coherent  Psychomotor Behavior:  no evidence of tardive dyskinesia, dystonia, or tics  Thought Process:  logical  Associations:  no loose associations  Thought Content:  passive suicidal ideation present  Insight:  good  Judgment:  intact  Oriented to:  time, person, and place  Attention Span and Concentration:  intact  Recent and Remote Memory:  intact  Language: Able to name objects, Able to repeat phrases and Able to read and write  Fund of Knowledge: appropriate  Muscle Strength and Tone: normal  Gait and Station: Normal    Post-Procedure Diagnosis:  MDD, recurrent, severe 296.33    Plan   - Gradually increase MT on Bra[employer]way as tolerated  - Dr. Sauceda will check timeline of moving patient to [employer] machine for 4PM appt      Eleazar Sherwood MD  [facility] Neuromodulation    I did see patient, performed motor threshold. Patient did not tolerate deep TMS, scheduled first available. Will make sure to switch her to theta burst on [employer].        Joann Sauceda MD

## 2019-10-23 DIAGNOSIS — F39 MOOD DISORDER (H): ICD-10-CM

## 2019-10-24 ENCOUNTER — OFFICE VISIT (OUTPATIENT)
Dept: PSYCHIATRY | Facility: CLINIC | Age: 24
End: 2019-10-24
Payer: COMMERCIAL

## 2019-10-24 VITALS — DIASTOLIC BLOOD PRESSURE: 73 MMHG | SYSTOLIC BLOOD PRESSURE: 112 MMHG | HEART RATE: 81 BPM

## 2019-10-24 DIAGNOSIS — F39 MOOD DISORDER (H): ICD-10-CM

## 2019-10-24 DIAGNOSIS — F33.2 SEVERE EPISODE OF RECURRENT MAJOR DEPRESSIVE DISORDER, WITHOUT PSYCHOTIC FEATURES (H): Primary | ICD-10-CM

## 2019-10-24 RX ORDER — GABAPENTIN 100 MG/1
100 CAPSULE ORAL 3 TIMES DAILY
Qty: 90 CAPSULE | Refills: 0 | Status: SHIPPED | OUTPATIENT
Start: 2019-10-24 | End: 2019-11-25

## 2019-10-24 ASSESSMENT — PATIENT HEALTH QUESTIONNAIRE - PHQ9: SUM OF ALL RESPONSES TO PHQ QUESTIONS 1-9: 21

## 2019-10-24 NOTE — PROGRESS NOTES
[facility] TMS Program  [address], [address]  [place] [address]  TMS Procedure Note   Hanna Berg MRN# [id number]  Age: 24 year old year old YOB: 1995    Pre-Procedure:  History and Physical: Reviewed in medical record  Consent Signed by: 10/21  On: Hanna Berg    Clinical Narrative:  Patient presents for TMS for the treatment of Major Depression that did not respond to conventional methods.    Indications for TMS:  MDD, recurrent, severe; 4+ medication trials (from 2+ classes) ineffective; Psychotherapy ineffective.     Pre-Procedure Diagnosis:  MDD, recurrent, severe 296.33    Treatment Hx:  Treatment number this series: 1  Total lifetime treatment number: 1    No Known Allergies   /73   Pulse 81     Pause for the Cause  Right patient:  Yes  Right procedure/correct coil:  Yes; rTMS; cpt 99190; F8 coil.   Earplugs in place:  Yes    Procedure  Patient was seated in procedure chair. Identity and procedure was verified. Ear plugs were placed in ears and patient-specific cap was placed on head and tightened appropriately. Ruler locations were verified. Coil was placed at treatment location and stimulator was set to  49% (90% of MT). A test train was delivered and pt tolerated train. Given pt tolerance, 75 treatment trains were delivered. Pt tolerated procedure well with some eyebrow movement but no other motor movement.    Motor Threshold Determination  Distance from nasion to inion: 37 cm  Tragus to Tragus: 38 cm  Head Circumference: 57 cm  MT 1: F3 @ 54% on 10/24/2019    F3 location:  Distance along circumference: 6.57 cm  Distance from vertex: 10.23 cm    Stimulation Parameters  LDLPFC  Frequency: 50 Hz  Number of pulses:  3                        Number of bursts: 10  Burst frequency: 5 Hz  Cycle time: 10 sec  Total pulses delivered: 1800  Tx Loc: F3  Energy: 49% (90% MT)  Number of Cycles: 60 trains        Rating Scales:  Date MADRS IDS-SR PHQ-9   10/24/19 28 33 21    --        Psychiatric Examination  Appearance:  awake, alert and adequately groomed  Attitude:  cooperative  Eye Contact:  good  Mood:  depressed  Affect:  appropriate and in normal range  Speech:  clear, coherent  Psychomotor Behavior:  no evidence of tardive dyskinesia, dystonia, or tics  Thought Process:  logical  Associations:  no loose associations  Thought Content:  no evidence of psychotic thought  Insight:  good  Judgment:  intact  Oriented to:  time, person, and place  Attention Span and Concentration:  intact  Recent and Remote Memory:  intact  Language: Able to name objects, Able to repeat phrases and Able to read and write  Fund of Knowledge: appropriate  Muscle Strength and Tone: normal  Gait and Station: Normal    Post-Procedure Diagnosis:  MDD, recurrent, severe 296.33    Karoline Pastor RN   [facility] Neuromodulation     Plan   - Cont TMS     I completed motor mapping and determination of the pt's motor threshold during the visit today. I was available in the clinic throughout the treatment.    Starr Beth MD  [facility] Neuromodulation

## 2019-10-24 NOTE — TELEPHONE ENCOUNTER
Last seen: 10/18/19  RTC: 3-4 weeks  Cancel: none  No-show: none  Next appt: none    Incoming refill from Pharmacy via Fax    Medication requested: Pramipexole 0.25 mg   Directions: taper up to 1 mg BID  Qty: 60  Last refilled: 9/30/19 with one refill     Medication refill approved per refill protocol

## 2019-10-25 ENCOUNTER — OFFICE VISIT (OUTPATIENT)
Dept: PSYCHIATRY | Facility: CLINIC | Age: 24
End: 2019-10-25
Payer: COMMERCIAL

## 2019-10-25 VITALS — HEART RATE: 64 BPM | SYSTOLIC BLOOD PRESSURE: 118 MMHG | DIASTOLIC BLOOD PRESSURE: 72 MMHG

## 2019-10-25 DIAGNOSIS — F32.2 CURRENT SEVERE EPISODE OF MAJOR DEPRESSIVE DISORDER WITHOUT PSYCHOTIC FEATURES WITHOUT PRIOR EPISODE (H): Primary | ICD-10-CM

## 2019-10-25 RX ORDER — PRAMIPEXOLE DIHYDROCHLORIDE 0.25 MG/1
TABLET ORAL
Qty: 60 TABLET | Refills: 1 | Status: SHIPPED | OUTPATIENT
Start: 2019-10-25 | End: 2020-02-24

## 2019-10-25 ASSESSMENT — PATIENT HEALTH QUESTIONNAIRE - PHQ9: SUM OF ALL RESPONSES TO PHQ QUESTIONS 1-9: 10

## 2019-10-25 NOTE — PROGRESS NOTES
[facility] TMS Program  [address], [address]  [place] [address]  TMS Procedure Note   Hanna Berg MRN# [id number]  Age: 24 year old year old YOB: 1995    Pre-Procedure:  History and Physical: Reviewed in medical record  Consent Signed by: 10/21  On: Hanna Berg    Clinical Narrative:  Patient is tolerating procedure.     Indications for TMS:  MDD, recurrent, severe; 4+ medication trials (from 2+ classes) ineffective; Psychotherapy ineffective.     Pre-Procedure Diagnosis:  MDD, recurrent, severe 296.33    Treatment Hx:  Treatment number this series: 2  Total lifetime treatment number: 2    No Known Allergies   /72 (BP Location: Right arm, Patient Position: Sitting, Cuff Size: Adult Regular)   Pulse 64     Pause for the Cause  Right patient:  Yes  Right procedure/correct coil:  Yes; rTMS; cpt 58002; H1 coil.   Earplugs in place:  Yes    Procedure  Patient was seated in procedure chair. Identity and procedure was verified. Ear plugs were placed in ears and patient-specific cap was placed on head and tightened appropriately. Ruler locations were verified. Coil was placed at treatment location and stimulator was set to parameters described below. A test train was delivered and pt tolerated train. Given pt tolerance, 60 treatment trains were delivered. Pt tolerated procedure with some eyebrow and forehead movement.    Motor Threshold Determination  Distance from nasion to inion: 37 cm  Tragus to Tragus: 38 cm  Head Circumference: 57 cm  MT 1: F3 @ 54% on 10/24/2019    F3 location:  Distance along circumference: 6.57 cm  Distance from vertex: 10.23 cm    Stimulation Parameters  LDLPFC  Frequency: 50 Hz  Number of pulses:  3                        Number of bursts: 10  Burst frequency: 5 Hz  Cycle time: 10 sec  Total pulses delivered: 1800  Tx Loc: F3  Energy: 49% (90% MT)  Number of Cycles: 60 trains        Rating Scales:  Date MADRS IDS-SR PHQ-9   10/24/19 28 33 21    --        Psychiatric Examination  Appearance:  awake, alert and adequately groomed  Attitude:  cooperative  Eye Contact:  good  Mood:  depressed  Affect:  appropriate and in normal range  Speech:  clear, coherent  Psychomotor Behavior:  no evidence of tardive dyskinesia, dystonia, or tics  Thought Process:  logical  Associations:  no loose associations  Thought Content:  no evidence of psychotic thought  Insight:  good  Judgment:  intact  Oriented to:  time, person, and place  Attention Span and Concentration:  intact  Recent and Remote Memory:  intact  Language: Able to name objects, Able to repeat phrases and Able to read and write  Fund of Knowledge: appropriate  Muscle Strength and Tone: normal  Gait and Station: Normal    Post-Procedure Diagnosis:  MDD, recurrent, severe 296.33    [facility] Neuromodulation     Plan   - Cont TMS     I did not see the patient during/after treatment but remained available in the clinic during  treatment.    Dereck Sharif MD, MD, PhD  [facility] Neuromodulation

## 2019-10-28 ENCOUNTER — OFFICE VISIT (OUTPATIENT)
Dept: PSYCHIATRY | Facility: CLINIC | Age: 24
End: 2019-10-28
Payer: COMMERCIAL

## 2019-10-28 VITALS — HEART RATE: 68 BPM | SYSTOLIC BLOOD PRESSURE: 131 MMHG | DIASTOLIC BLOOD PRESSURE: 81 MMHG

## 2019-10-28 DIAGNOSIS — F33.2 SEVERE EPISODE OF RECURRENT MAJOR DEPRESSIVE DISORDER, WITHOUT PSYCHOTIC FEATURES (H): Primary | ICD-10-CM

## 2019-10-28 NOTE — PROGRESS NOTES
[facility] TMS Program  [address]  [place] [address]  TMS Procedure Note   Hanna Berg MRN# [id number]  Age: 24 year old year old YOB: 1995    Pre-Procedure:  History and Physical: Reviewed in medical record  Consent Signed by: 10/21  On: Hanna Berg    Clinical Narrative:  Patient is tolerating procedure. No change in mood and no side effects. Patient reported having a good weekend. She went to a [employer] party and hay ride with some friends which was fun.     Indications for TMS:  MDD, recurrent, severe; 4+ medication trials (from 2+ classes) ineffective; Psychotherapy ineffective.     Pre-Procedure Diagnosis:  MDD, recurrent, severe 296.33    Treatment Hx:  Treatment number this series: 3  Total lifetime treatment number: 3    No Known Allergies   /81 (BP Location: Right arm, Patient Position: Sitting, Cuff Size: Adult Regular)   Pulse 68     Pause for the Cause  Right patient:  Yes  Right procedure/correct coil:  Yes; rTMS; cpt 95689; H1 coil.   Earplugs in place:  Yes    Procedure  Patient was seated in procedure chair. Identity and procedure was verified. Ear plugs were placed in ears and patient-specific cap was placed on head and tightened appropriately. Ruler locations were verified. Coil was placed at treatment location and stimulator was set to parameters described below. A test train was delivered and pt tolerated train. Given pt tolerance, 60 treatment trains were delivered. Pt tolerated procedure with some eyebrow and forehead movement.    Motor Threshold Determination  Distance from nasion to inion: 37 cm  Tragus to Tragus: 38 cm  Head Circumference: 57 cm  MT 1: F3 @ 54% on 10/24/2019    F3 location:  Distance along circumference: 6.57 cm  Distance from vertex: 10.23 cm    Stimulation Parameters  LDLPFC  Frequency: 50 Hz  Number of pulses:  3                        Number of bursts: 10  Burst frequency: 5 Hz  Cycle time: 10 sec  Total pulses  delivered: 1800  Tx Loc: F3  Energy: 49% (90% MT)  Number of Cycles: 60 trains        Rating Scales:  Date MADRS IDS-SR PHQ-9   10/24/19 28 33 21    --           Post-Procedure Diagnosis:  MDD, recurrent, severe 296.33    Scar Altamirano  [facility]   Mental [facility] Neuromodulation     Plan   - Cont TMS     I did not see patient but remained available at the clinic throughout the TMS session.    Joann Sauceda MD  [facility] Neuromodulation

## 2019-10-29 ENCOUNTER — OFFICE VISIT (OUTPATIENT)
Dept: PSYCHIATRY | Facility: CLINIC | Age: 24
End: 2019-10-29
Payer: COMMERCIAL

## 2019-10-29 VITALS — HEART RATE: 75 BPM | SYSTOLIC BLOOD PRESSURE: 124 MMHG | DIASTOLIC BLOOD PRESSURE: 89 MMHG

## 2019-10-29 DIAGNOSIS — F32.2 CURRENT SEVERE EPISODE OF MAJOR DEPRESSIVE DISORDER WITHOUT PSYCHOTIC FEATURES WITHOUT PRIOR EPISODE (H): Primary | ICD-10-CM

## 2019-10-29 ASSESSMENT — PATIENT HEALTH QUESTIONNAIRE - PHQ9: SUM OF ALL RESPONSES TO PHQ QUESTIONS 1-9: 9

## 2019-10-29 NOTE — PROGRESS NOTES
[facility] TMS Program  [address], [address]  [place] [address]  TMS Procedure Note   Hanna Berg MRN# [id number]  Age: 24 year old year old YOB: 1995    Pre-Procedure:  History and Physical: Reviewed in medical record  Consent Signed by: 10/21  On: Hanna Berg    Clinical Narrative:  Patient is tolerating procedure. Patient reports no changes from yesterday.     Indications for TMS:  MDD, recurrent, severe; 4+ medication trials (from 2+ classes) ineffective; Psychotherapy ineffective.     Pre-Procedure Diagnosis:  MDD, recurrent, severe 296.33    Treatment Hx:  Treatment number this series: 4  Total lifetime treatment number: 4    No Known Allergies   /89 (BP Location: Right arm, Patient Position: Sitting, Cuff Size: Adult Regular)   Pulse 75     Pause for the Cause  Right patient:  Yes  Right procedure/correct coil:  Yes; rTMS; cpt 19962; H1 coil.   Earplugs in place:  Yes    Procedure  Patient was seated in procedure chair. Identity and procedure was verified. Ear plugs were placed in ears and patient-specific cap was placed on head and tightened appropriately. Ruler locations were verified. Coil was placed at treatment location and stimulator was set to parameters described below. A test train was delivered and pt tolerated train. Given pt tolerance, 60 treatment trains were delivered. Pt tolerated procedure with some eyebrow and forehead movement.    Motor Threshold Determination  Distance from nasion to inion: 37 cm  Tragus to Tragus: 38 cm  Head Circumference: 57 cm  MT 1: F3 @ 54% on 10/24/2019    F3 location:  Distance along circumference: 6.57 cm  Distance from vertex: 10.23 cm    Stimulation Parameters  LDLPFC  Frequency: 50 Hz  Number of pulses:  3                        Number of bursts: 10  Burst frequency: 5 Hz  Cycle time: 10 sec  Total pulses delivered: 1800  Tx Loc: F3  Energy: 49% (90% MT)  Number of Cycles: 60 trains        Rating Scales:  Date MADRS  IDS-SR PHQ-9   10/24/19 28 33 21    --           Post-Procedure Diagnosis:  MDD, recurrent, severe 296.33    Amada Reed  [facility]   Mental [facility] Neuromodulation     Plan   - Cont TMS     I did not see the patient during/after treatment but remained available in the clinic during  treatment.    Starr Beth MD  [facility] Neuromodulation

## 2019-10-31 ENCOUNTER — OFFICE VISIT (OUTPATIENT)
Dept: PSYCHIATRY | Facility: CLINIC | Age: 24
End: 2019-10-31
Payer: COMMERCIAL

## 2019-10-31 VITALS — DIASTOLIC BLOOD PRESSURE: 72 MMHG | SYSTOLIC BLOOD PRESSURE: 116 MMHG | HEART RATE: 98 BPM

## 2019-10-31 DIAGNOSIS — F33.2 SEVERE EPISODE OF RECURRENT MAJOR DEPRESSIVE DISORDER, WITHOUT PSYCHOTIC FEATURES (H): Primary | ICD-10-CM

## 2019-10-31 ASSESSMENT — PATIENT HEALTH QUESTIONNAIRE - PHQ9: SUM OF ALL RESPONSES TO PHQ QUESTIONS 1-9: 16

## 2019-10-31 NOTE — TELEPHONE ENCOUNTER
Central Prior Authorization Team   Phone: [phone]      PA Initiation    Medication: esketamine (SPRAVATO) 56 mg dose kit  Insurance Company: [employer] - Phone [phone] Fax [phone]  Pharmacy Filling the Rx: [facility] [id number] - [place], [place] - [facility] [address]  Filling Pharmacy Phone: [phone]  Filling Pharmacy Fax:    Start Date: 10/31/2019

## 2019-10-31 NOTE — PROGRESS NOTES
[facility] TMS Program  [address], [address]  [place] [address]  TMS Procedure Note   Hanna Berg MRN# [id number]  Age: 24 year old year old YOB: 1995    Pre-Procedure:  History and Physical: Reviewed in medical record  Consent Signed by: 10/21  On: Hanna Berg    Clinical Narrative:  Patient is tolerating procedure. Patient report not feeling well emotionally today. No reason in particular.     Indications for TMS:  MDD, recurrent, severe; 4+ medication trials (from 2+ classes) ineffective; Psychotherapy ineffective.     Pre-Procedure Diagnosis:  MDD, recurrent, severe 296.33    Treatment Hx:  Treatment number this series: 5  Total lifetime treatment number: 5    No Known Allergies   /72 (BP Location: Right arm, Patient Position: Sitting, Cuff Size: Adult Regular)   Pulse 98     Pause for the Cause  Right patient:  Yes  Right procedure/correct coil:  Yes; rTMS; cpt 82783; H1 coil.   Earplugs in place:  Yes    Procedure  Patient was seated in procedure chair. Identity and procedure was verified. Ear plugs were placed in ears and patient-specific cap was placed on head and tightened appropriately. Ruler locations were verified. Coil was placed at treatment location and stimulator was set to parameters described below. A test train was delivered and pt tolerated train. Given pt tolerance, 60 treatment trains were delivered. Pt tolerated procedure with some eyebrow and forehead movement.    Motor Threshold Determination  Distance from nasion to inion: 37 cm  Tragus to Tragus: 38 cm  Head Circumference: 57 cm  MT 1: F3 @ 54% on 10/24/2019    F3 location:  Distance along circumference: 6.57 cm  Distance from vertex: 10.23 cm    Stimulation Parameters  LDLPFC  Frequency: 50 Hz  Number of pulses:  3                        Number of bursts: 10  Burst frequency: 5 Hz  Cycle time: 10 sec  Total pulses delivered: 1800  Tx Loc: F3  Energy: 49% (90% MT)  Number of Cycles: 60  trains        Rating Scales:  Date MADRS IDS-SR PHQ-9   10/24/19 28 33 21    --           Post-Procedure Diagnosis:  MDD, recurrent, severe 296.33    Scar Altamirano  [facility]   Mental [facility] Neuromodulation     Plan   - Cont TMS     I did not see the patient during/after treatment but remained available in the clinic during  treatment.    Starr Beth MD  [facility] Neuromodulation

## 2019-11-01 ENCOUNTER — OFFICE VISIT (OUTPATIENT)
Dept: PSYCHIATRY | Facility: CLINIC | Age: 24
End: 2019-11-01
Payer: COMMERCIAL

## 2019-11-01 VITALS — DIASTOLIC BLOOD PRESSURE: 80 MMHG | SYSTOLIC BLOOD PRESSURE: 136 MMHG | HEART RATE: 72 BPM

## 2019-11-01 DIAGNOSIS — F32.2 CURRENT SEVERE EPISODE OF MAJOR DEPRESSIVE DISORDER WITHOUT PSYCHOTIC FEATURES WITHOUT PRIOR EPISODE (H): Primary | ICD-10-CM

## 2019-11-01 ASSESSMENT — PATIENT HEALTH QUESTIONNAIRE - PHQ9: SUM OF ALL RESPONSES TO PHQ QUESTIONS 1-9: 13

## 2019-11-01 NOTE — PROGRESS NOTES
[facility] TMS Program  [address], [address]  [place] [address]  TMS Procedure Note   Hanna Berg MRN# [id number]  Age: 24 year old year old YOB: 1995    Pre-Procedure:  History and Physical: Reviewed in medical record  Consent Signed by: 10/21  On: Hanna Berg    Clinical Narrative:  Patient is tolerating procedure. Patient reports no changes from yesterday. Added right sided treatment today.     Indications for TMS:  MDD, recurrent, severe; 4+ medication trials (from 2+ classes) ineffective; Psychotherapy ineffective.     Pre-Procedure Diagnosis:  MDD, recurrent, severe 296.33    Treatment Hx:  Treatment number this series: 6  Total lifetime treatment number: 6    No Known Allergies   /80 (BP Location: Right arm, Patient Position: Sitting, Cuff Size: Adult Regular)   Pulse 72     Pause for the Cause  Right patient:  Yes  Right procedure/correct coil:  Yes; rTMS; cpt 33313; H1 coil.   Earplugs in place:  Yes    Procedure  Patient was seated in procedure chair. Identity and procedure was verified. Ear plugs were placed in ears and patient-specific cap was placed on head and tightened appropriately. Ruler locations were verified. Coil was placed at treatment location and stimulator was set to parameters described below. A test train was delivered and pt tolerated train. Given pt tolerance, 60 treatment trains were delivered. Pt tolerated procedure with some eyebrow and forehead movement.    Motor Threshold Determination  Distance from nasion to inion: 37 cm  Tragus to Tragus: 38 cm  Head Circumference: 57 cm  MT 1: F3 @ 54% on 10/24/2019    F3 location:  Distance along circumference: 6.57 cm  Distance from vertex: 10.23 cm    Stimulation Parameters  LDLPFC  Frequency: 50 Hz  Number of pulses:  3                        Number of bursts: 10  Burst frequency: 5 Hz  Cycle time: 10 sec  Total pulses delivered: 1800  Tx Loc: F3  Energy: 49% (90% MT)  Number of Cycles: 60  trains    RDLPFC:  Frequency: 50 Hz  Number of pulses:  3                        Number of bursts: 300  Burst frequency: 5 Hz  Cycle time: 61.0 sec  Total pulses delivered: 1800  Tx Loc: F4 (right side)  Energy: 48% (90% MT)  Number of Cycles: 2 trains      Rating Scales:  Date MADRS IDS-SR PHQ-9   10/24/19 28 33 21   11/1/19 -- 34 13         Post-Procedure Diagnosis:  MDD, recurrent, severe 296.33    Amada [facility] Neuromodulation     Plan   - Cont TMS     I did not see the patient during/after treatment but remained available in the clinic during  treatment.    Dereck Sharif MD, MD, PhD  [facility] Neuromodulation

## 2019-11-01 NOTE — TELEPHONE ENCOUNTER
PRIOR AUTHORIZATION DENIED    Medication: esketamine (SPRAVATO) 56 mg dose kit-DENIED    Denial Date: 10/31/2019    Denial Rational:           Appeal Information:

## 2019-11-04 ENCOUNTER — DOCUMENTATION ONLY (OUTPATIENT)
Dept: PSYCHIATRY | Facility: CLINIC | Age: 24
End: 2019-11-04

## 2019-11-04 ENCOUNTER — OFFICE VISIT (OUTPATIENT)
Dept: PSYCHIATRY | Facility: CLINIC | Age: 24
End: 2019-11-04
Payer: COMMERCIAL

## 2019-11-04 VITALS — SYSTOLIC BLOOD PRESSURE: 130 MMHG | DIASTOLIC BLOOD PRESSURE: 80 MMHG | HEART RATE: 73 BPM

## 2019-11-04 DIAGNOSIS — F33.2 SEVERE EPISODE OF RECURRENT MAJOR DEPRESSIVE DISORDER, WITHOUT PSYCHOTIC FEATURES (H): Primary | ICD-10-CM

## 2019-11-04 ASSESSMENT — PATIENT HEALTH QUESTIONNAIRE - PHQ9: SUM OF ALL RESPONSES TO PHQ QUESTIONS 1-9: 20

## 2019-11-04 NOTE — TELEPHONE ENCOUNTER
Medication Appeal Initiation    We have initiated an appeal for the requested medication:  Medication: esketamine (SPRAVATO) 56 mg dose kit-APPEAL Pending  Appeal Start Date:  11/4/2019  Insurance Company: [email or web address] - Phone [phone] Fax [phone]  Comments:  Appeal letter faxed along with office notes from 7/17/19 and 9/20/19. Faxed to [facility] Special[facility] Appeals Dept [phone].

## 2019-11-04 NOTE — PROGRESS NOTES
[facility] TMS Program  [address], [address]  [place] [address]  TMS Procedure Note   Hanna Berg MRN# [id number]  Age: 24 year old year old YOB: 1995    Pre-Procedure:  History and Physical: Reviewed in medical record  Consent Signed by: 10/21  On: Hanna Berg    Clinical Narrative:  Patient is tolerating procedure. Patient reports no changes from Friday. Tried increasing right sided to 100% of MT, patient wasn't able to tolerate.      Indications for TMS:  MDD, recurrent, severe; 4+ medication trials (from 2+ classes) ineffective; Psychotherapy ineffective.     Pre-Procedure Diagnosis:  MDD, recurrent, severe 296.33    Treatment Hx:  Treatment number this series: 7  Total lifetime treatment number: 7    No Known Allergies   /80 (BP Location: Right arm, Patient Position: Sitting, Cuff Size: Adult Regular)   Pulse 73     Pause for the Cause  Right patient:  Yes  Right procedure/correct coil:  Yes; rTMS; cpt 26217; H1 coil.   Earplugs in place:  Yes    Procedure  Patient was seated in procedure chair. Identity and procedure was verified. Ear plugs were placed in ears and patient-specific cap was placed on head and tightened appropriately. Ruler locations were verified. Coil was placed at treatment location and stimulator was set to parameters described below. A test train was delivered and pt tolerated train. Given pt tolerance, 60 treatment trains were delivered. Pt tolerated procedure with some eyebrow and forehead movement.    Motor Threshold Determination  Distance from nasion to inion: 37 cm  Tragus to Tragus: 38 cm  Head Circumference: 57 cm  MT 1: F3 @ 54% on 10/24/2019    F3 location:  Distance along circumference: 6.57 cm  Distance from vertex: 10.23 cm    Stimulation Parameters  LDLPFC  Frequency: 50 Hz  Number of pulses:  3                        Number of bursts: 10  Burst frequency: 5 Hz  Cycle time: 10 sec  Total pulses delivered: 1800  Tx Loc: F3  Energy: 49%  (90% MT)  Number of Cycles: 60 trains    RDLPFC:  Frequency: 50 Hz  Number of pulses:  3                        Number of bursts: 300  Burst frequency: 5 Hz  Cycle time: 61.0 sec  Total pulses delivered: 1800  Tx Loc: F4 (right side)  Energy: 48% (90% MT)  Number of Cycles: 2 trains      Rating Scales:  Date MADRS IDS-SR PHQ-9   10/24/19 28 33 21   11/1/19 -- 34 13         Post-Procedure Diagnosis:  MDD, recurrent, severe 296.33    Scar Altamirano  [facility] Neuromodulation     Plan   - Cont TMS     I did not see the patient during/after treatment but remained available in the clinic during  treatment.    Starr Beth MD  [facility] Neuromodulation

## 2019-11-04 NOTE — PROGRESS NOTES
Spoke w/ Dr. Galloway (independent reviewer for patient's Esketamine appeal) for a doc-to-doc. He reported that the insurance did not have any dosage information on file for past medication trials and suggested faxing such insurance as part of the appeal. Also clarified that patient's formal mood diagnosis is MDD, recurrent. Also reviewed that while patient was using alcohol, she did not appear to have alcohol use disorder based on current known history.     Will request clinic staff forward patient's MTM from Dr. Jon dated 7/17/19 to insurance as part of appeal.     Eleazar Sherwood MD  PGY-4 Psychiatry Resident

## 2019-11-05 ENCOUNTER — OFFICE VISIT (OUTPATIENT)
Dept: PSYCHIATRY | Facility: CLINIC | Age: 24
End: 2019-11-05
Payer: COMMERCIAL

## 2019-11-05 VITALS — DIASTOLIC BLOOD PRESSURE: 73 MMHG | SYSTOLIC BLOOD PRESSURE: 127 MMHG | HEART RATE: 76 BPM

## 2019-11-05 DIAGNOSIS — F32.2 CURRENT SEVERE EPISODE OF MAJOR DEPRESSIVE DISORDER WITHOUT PSYCHOTIC FEATURES WITHOUT PRIOR EPISODE (H): Primary | ICD-10-CM

## 2019-11-05 ASSESSMENT — PATIENT HEALTH QUESTIONNAIRE - PHQ9: SUM OF ALL RESPONSES TO PHQ QUESTIONS 1-9: 20

## 2019-11-05 NOTE — PROGRESS NOTES
[facility] TMS Program  [address], [address]  [place] [address]  TMS Procedure Note   Hanna Berg MRN# [id number]  Age: 24 year old year old YOB: 1995    Pre-Procedure:  History and Physical: Reviewed in medical record  Consent Signed by: 10/21  On: Hanna Berg    Clinical Narrative:  Patient is tolerating procedure. Patient reports no changes from yesterday.     Indications for TMS:  MDD, recurrent, severe; 4+ medication trials (from 2+ classes) ineffective; Psychotherapy ineffective.     Pre-Procedure Diagnosis:  MDD, recurrent, severe 296.33    Treatment Hx:  Treatment number this series: 8  Total lifetime treatment number: 8    No Known Allergies   /73 (BP Location: Right arm, Patient Position: Sitting, Cuff Size: Adult Regular)   Pulse 76     Pause for the Cause  Right patient:  Yes  Right procedure/correct coil:  Yes; rTMS; cpt 10917; H1 coil.   Earplugs in place:  Yes    Procedure  Patient was seated in procedure chair. Identity and procedure was verified. Ear plugs were placed in ears and patient-specific cap was placed on head and tightened appropriately. Ruler locations were verified. Coil was placed at treatment location and stimulator was set to parameters described below. A test train was delivered and pt tolerated train. Given pt tolerance, 60 treatment trains were delivered. Pt tolerated procedure with some eyebrow and forehead movement.    Motor Threshold Determination  Distance from nasion to inion: 37 cm  Tragus to Tragus: 38 cm  Head Circumference: 57 cm  MT 1: F3 @ 54% on 10/24/2019    F3 location:  Distance along circumference: 6.57 cm  Distance from vertex: 10.23 cm    Stimulation Parameters  LDLPFC  Frequency: 50 Hz  Number of pulses:  3                        Number of bursts: 10  Burst frequency: 5 Hz  Cycle time: 10 sec  Total pulses delivered: 1800  Tx Loc: F3  Energy: 49% (90% MT)  Number of Cycles: 60 trains    RDLPFC:  Frequency: 50 Hz  Number  of pulses:  3                        Number of bursts: 300  Burst frequency: 5 Hz  Cycle time: 61.0 sec  Total pulses delivered: 1800  Tx Loc: F4 (right side)  Energy: 50% (92% MT)  Number of Cycles: 2 trains      Rating Scales:  Date MADRS IDS-SR PHQ-9   10/24/19 28 33 21   11/1/19 -- 34 13         Post-Procedure Diagnosis:  MDD, recurrent, severe 296.33    Amada Reed  [facility] Neuromodulation     Plan   - Cont TMS     I did not see the patient during/after treatment but remained available in the clinic during  treatment.    Starr Beth MD  [facility] Neuromodulation

## 2019-11-06 ENCOUNTER — OFFICE VISIT (OUTPATIENT)
Dept: PSYCHIATRY | Facility: CLINIC | Age: 24
End: 2019-11-06
Payer: COMMERCIAL

## 2019-11-06 VITALS — SYSTOLIC BLOOD PRESSURE: 121 MMHG | DIASTOLIC BLOOD PRESSURE: 77 MMHG | HEART RATE: 82 BPM

## 2019-11-06 DIAGNOSIS — F32.2 CURRENT SEVERE EPISODE OF MAJOR DEPRESSIVE DISORDER WITHOUT PSYCHOTIC FEATURES WITHOUT PRIOR EPISODE (H): Primary | ICD-10-CM

## 2019-11-06 ASSESSMENT — PATIENT HEALTH QUESTIONNAIRE - PHQ9: SUM OF ALL RESPONSES TO PHQ QUESTIONS 1-9: 18

## 2019-11-06 NOTE — PROGRESS NOTES
[facility] TMS Program  [address], [address]  [place] [address]  TMS Procedure Note   Hanna Berg MRN# [id number]  Age: 24 year old year old YOB: 1995    Pre-Procedure:  History and Physical: Reviewed in medical record  Consent Signed by: 10/21  On: Hanna Berg    Clinical Narrative:  Patient is tolerating procedure. Patient reports no changes from yesterday.     Indications for TMS:  MDD, recurrent, severe; 4+ medication trials (from 2+ classes) ineffective; Psychotherapy ineffective.     Pre-Procedure Diagnosis:  MDD, recurrent, severe 296.33    Treatment Hx:  Treatment number this series: 9  Total lifetime treatment number: 9    No Known Allergies   /77 (BP Location: Right arm, Patient Position: Sitting, Cuff Size: Adult Regular)   Pulse 82     Pause for the Cause  Right patient:  Yes  Right procedure/correct coil:  Yes; rTMS; cpt 32308; H1 coil.   Earplugs in place:  Yes    Procedure  Patient was seated in procedure chair. Identity and procedure was verified. Ear plugs were placed in ears and patient-specific cap was placed on head and tightened appropriately. Ruler locations were verified. Coil was placed at treatment location and stimulator was set to parameters described below. A test train was delivered and pt tolerated train. Given pt tolerance, 60 treatment trains were delivered. Pt tolerated procedure with some eyebrow and forehead movement.    Motor Threshold Determination  Distance from nasion to inion: 37 cm  Tragus to Tragus: 38 cm  Head Circumference: 57 cm  MT 1: F3 @ 54% on 10/24/2019    F3 location:  Distance along circumference: 6.57 cm  Distance from vertex: 10.23 cm    Stimulation Parameters  LDLPFC  Frequency: 50 Hz  Number of pulses:  3                        Number of bursts: 10  Burst frequency: 5 Hz  Cycle time: 10 sec  Total pulses delivered: 1800  Tx Loc: F3  Energy: 49% (90% MT)  Number of Cycles: 60 trains    RDLPFC:  Frequency: 50 Hz  Number  of pulses:  3                        Number of bursts: 300  Burst frequency: 5 Hz  Cycle time: 61.0 sec  Total pulses delivered: 1800  Tx Loc: F4 (right side)  Energy: 51% (95% MT)  Number of Cycles: 2 trains      Rating Scales:  Date MADRS IDS-SR PHQ-9   10/24/19 28 33 21   11/1/19 -- 34 13         Post-Procedure Diagnosis:  MDD, recurrent, severe 296.33    [facility]   Mental Health Neuromodulation     Plan   - Cont TMS       I was available in the clinic throughout the treatment but did not evaluate the patient.  Renetta Castro M.D., Ph.D.     Dept. of Psychiatry   [facility]

## 2019-11-06 NOTE — TELEPHONE ENCOUNTER
Gpoa-Ee-Kako was done however, per note below, insurance does not have the dosage on tried and failed medications. Appeal was originally faxed with office notes from 7/17/19 and 9/20/19 to show tried and failed medications and rating scales for PHQ-9 score. Re-faxed appeal to insurance again.

## 2019-11-07 ENCOUNTER — OFFICE VISIT (OUTPATIENT)
Dept: PSYCHIATRY | Facility: CLINIC | Age: 24
End: 2019-11-07
Payer: COMMERCIAL

## 2019-11-07 VITALS — HEART RATE: 71 BPM | SYSTOLIC BLOOD PRESSURE: 121 MMHG | DIASTOLIC BLOOD PRESSURE: 73 MMHG

## 2019-11-07 DIAGNOSIS — F33.2 SEVERE EPISODE OF RECURRENT MAJOR DEPRESSIVE DISORDER, WITHOUT PSYCHOTIC FEATURES (H): Primary | ICD-10-CM

## 2019-11-07 ASSESSMENT — PATIENT HEALTH QUESTIONNAIRE - PHQ9: SUM OF ALL RESPONSES TO PHQ QUESTIONS 1-9: 14

## 2019-11-07 NOTE — PROGRESS NOTES
[facility] TMS Program  [address]  [place] [address]  TMS Procedure Note   Hanna Berg MRN# [id number]  Age: 24 year old year old YOB: 1995    Pre-Procedure:  History and Physical: Reviewed in medical record  Consent Signed by: 10/21  On: Hanna Berg    Clinical Narrative:  Patient is tolerating procedure. Patient reports no changes from yesterday.     Indications for TMS:  MDD, recurrent, severe; 4+ medication trials (from 2+ classes) ineffective; Psychotherapy ineffective.     Pre-Procedure Diagnosis:  MDD, recurrent, severe 296.33    Treatment Hx:  Treatment number this series: 10  Total lifetime treatment number: 10    No Known Allergies   /73 (BP Location: Left arm, Patient Position: Sitting, Cuff Size: Adult Regular)   Pulse 71     Pause for the Cause  Right patient:  Yes  Right procedure/correct coil:  Yes; rTMS; cpt 54602; H1 coil.   Earplugs in place:  Yes    Procedure  Patient was seated in procedure chair. Identity and procedure was verified. Ear plugs were placed in ears and patient-specific cap was placed on head and tightened appropriately. Ruler locations were verified. Coil was placed at treatment location and stimulator was set to parameters described below. A test train was delivered and pt tolerated train. Given pt tolerance, 60 treatment trains were delivered. Pt tolerated procedure with some eyebrow and forehead movement.    Motor Threshold Determination  Distance from nasion to inion: 37 cm  Tragus to Tragus: 38 cm  Head Circumference: 57 cm  MT 1: F3 @ 54% on 10/24/2019    F3 location:  Distance along circumference: 6.57 cm  Distance from vertex: 10.23 cm    Stimulation Parameters  LDLPFC  Frequency: 50 Hz  Number of pulses:  3                        Number of bursts: 10  Burst frequency: 5 Hz  Cycle time: 10 sec  Total pulses delivered: 1800  Tx Loc: F3  Energy: 49% (90% MT)  Number of Cycles: 60 trains    RDLPFC:  Frequency: 50 Hz  Number  of pulses:  3                        Number of bursts: 300  Burst frequency: 5 Hz  Cycle time: 64.6 sec  Total pulses delivered: 1800  Tx Loc: F4 (right side)  Energy: 54% (100% MT)  Number of Cycles: 2 trains      Rating Scales:  Date MADRS IDS-SR PHQ-9   10/24/19 28 33 21   11/1/19 -- 34 13         Post-Procedure Diagnosis:  MDD, recurrent, severe 296.33    Scar Altamirano  [facility] Neuromodulation     Plan   - Cont TMS   - ReMT      I did not see the patient during/after treatment but remained available in the clinic during  treatment.    Starr Beth MD  [facility] Neuromodulation

## 2019-11-07 NOTE — TELEPHONE ENCOUNTER
MEDICATION APPEAL DENIED    Medication: esketamine (SPRAVATO) 56 mg dose kit-APPEAL Pending    Denial Date: 11/7/2019    Denial Rational: First level appeal was forwarded to an external review ([employer]) to determine if appeal will be qualified for review per [facility]            Second Level Appeal Information: Second level appeals will be managed by the clinic staff and provider.

## 2019-11-13 ENCOUNTER — TELEPHONE (OUTPATIENT)
Dept: PSYCHIATRY | Facility: CLINIC | Age: 24
End: 2019-11-13

## 2019-11-13 ENCOUNTER — OFFICE VISIT (OUTPATIENT)
Dept: PSYCHIATRY | Facility: CLINIC | Age: 24
End: 2019-11-13
Payer: COMMERCIAL

## 2019-11-13 VITALS — DIASTOLIC BLOOD PRESSURE: 87 MMHG | HEART RATE: 80 BPM | SYSTOLIC BLOOD PRESSURE: 132 MMHG

## 2019-11-13 DIAGNOSIS — F32.2 CURRENT SEVERE EPISODE OF MAJOR DEPRESSIVE DISORDER WITHOUT PSYCHOTIC FEATURES WITHOUT PRIOR EPISODE (H): Primary | ICD-10-CM

## 2019-11-13 ASSESSMENT — PATIENT HEALTH QUESTIONNAIRE - PHQ9: SUM OF ALL RESPONSES TO PHQ QUESTIONS 1-9: 13

## 2019-11-13 NOTE — TELEPHONE ENCOUNTER
What is the concern that needs to be addressed by a nurse? Patient would like a call back to discuss PA that was denied.Please call patient.    May a detailed message be left on voicemail?     Date of last office visit: 11/07/2019    Message routed to: [facility]

## 2019-11-13 NOTE — PROGRESS NOTES
[facility] TMS Program  [address], [address]  [place] [address]  TMS Procedure Note   Hanna Berg MRN# [id number]  Age: 24 year old year old YOB: 1995    Pre-Procedure:  History and Physical: Reviewed in medical record  Consent Signed by: 10/21  On: Hanna Berg    Clinical Narrative:  Patient is tolerating procedure. Patient reports no changes from yesterday.     Indications for TMS:  MDD, recurrent, severe; 4+ medication trials (from 2+ classes) ineffective; Psychotherapy ineffective.     Pre-Procedure Diagnosis:  MDD, recurrent, severe 296.33    Treatment Hx:  Treatment number this series: 11  Total lifetime treatment number: 11    No Known Allergies   /87 (BP Location: Right arm, Patient Position: Sitting, Cuff Size: Adult Regular)   Pulse 80     Pause for the Cause  Right patient:  Yes  Right procedure/correct coil:  Yes; rTMS; cpt 95459; H1 coil.   Earplugs in place:  Yes    Procedure  Patient was seated in procedure chair. Identity and procedure was verified. Ear plugs were placed in ears and patient-specific cap was placed on head and tightened appropriately. Ruler locations were verified. Coil was placed at treatment location and stimulator was set to parameters described below. A test train was delivered and pt tolerated train. Given pt tolerance, 60 treatment trains were delivered. Pt tolerated procedure with some eyebrow and forehead movement.    Motor Threshold Determination  Distance from nasion to inion: 37 cm  Tragus to Tragus: 38 cm  Head Circumference: 57 cm  MT 1: F3 @ 54% on 10/24/2019    F3 location:  Distance along circumference: 6.57 cm  Distance from vertex: 10.23 cm    Stimulation Parameters  LDLPFC  Frequency: 50 Hz  Number of pulses:  3                        Number of bursts: 10  Burst frequency: 5 Hz  Cycle time: 10 sec  Total pulses delivered: 1800  Tx Loc: F3  Energy: 49% (90% MT)  Number of Cycles: 60 trains    RDLPFC:  Frequency: 50 Hz  Number  of pulses:  3                        Number of bursts: 300  Burst frequency: 5 Hz  Cycle time: 64.6 sec  Total pulses delivered: 1800  Tx Loc: F4 (right side)  Energy: 54% (100% MT)  Number of Cycles: 2 trains      Rating Scales:  Date MADRS IDS-SR PHQ-9   10/24/19 28 33 21   11/1/19 -- 34 13         Post-Procedure Diagnosis:  MDD, recurrent, severe 296.33    [facility]   Mental Health Neuromodulation     Plan   - Cont TMS   - ReMT    I was available in the clinic throughout the treatment but did not evaluate the patient.  Renetta Castro M.D., Ph.D.     Dept. of Psychiatry   [facility]

## 2019-11-14 ENCOUNTER — OFFICE VISIT (OUTPATIENT)
Dept: PSYCHIATRY | Facility: CLINIC | Age: 24
End: 2019-11-14
Payer: COMMERCIAL

## 2019-11-14 VITALS — DIASTOLIC BLOOD PRESSURE: 73 MMHG | HEART RATE: 82 BPM | SYSTOLIC BLOOD PRESSURE: 124 MMHG

## 2019-11-14 DIAGNOSIS — F33.2 SEVERE EPISODE OF RECURRENT MAJOR DEPRESSIVE DISORDER, WITHOUT PSYCHOTIC FEATURES (H): Primary | ICD-10-CM

## 2019-11-14 ASSESSMENT — PATIENT HEALTH QUESTIONNAIRE - PHQ9: SUM OF ALL RESPONSES TO PHQ QUESTIONS 1-9: 16

## 2019-11-14 NOTE — PROGRESS NOTES
[facility] TMS Program  [address], [address]  [place] [address]  TMS Procedure Note   Hanna Berg MRN# [id number]  Age: 24 year old year old YOB: 1995    Pre-Procedure:  History and Physical: Reviewed in medical record  Consent Signed by: 10/21  On: Hanna Berg    Clinical Narrative:  Patient is tolerating procedure. Patient reports no mood changes. She recently came back from her trip to [place] which was good.    Indications for TMS:  MDD, recurrent, severe; 4+ medication trials (from 2+ classes) ineffective; Psychotherapy ineffective.     Pre-Procedure Diagnosis:  MDD, recurrent, severe 296.33    Treatment Hx:  Treatment number this series: 12  Total lifetime treatment number: 12    No Known Allergies   /73 (BP Location: Right arm, Patient Position: Sitting, Cuff Size: Adult Regular)   Pulse 82     Pause for the Cause  Right patient:  Yes  Right procedure/correct coil:  Yes; rTMS; cpt 49396; H1 coil.   Earplugs in place:  Yes    Procedure  Patient was seated in procedure chair. Identity and procedure was verified. Ear plugs were placed in ears and patient-specific cap was placed on head and tightened appropriately. Ruler locations were verified. Coil was placed at treatment location and stimulator was set to parameters described below. A test train was delivered and pt tolerated train. Given pt tolerance, 60 treatment trains were delivered. Pt tolerated procedure with some eyebrow and forehead movement.    Motor Threshold Determination  Distance from nasion to inion: 37 cm  Tragus to Tragus: 38 cm  Head Circumference: 57 cm  MT 1: F3 @ 54% on 10/24/2019    F3 location:  Distance along circumference: 6.57 cm  Distance from vertex: 10.23 cm    Stimulation Parameters  LDLPFC  Frequency: 50 Hz  Number of pulses:  3                        Number of bursts: 10  Burst frequency: 5 Hz  Cycle time: 10 sec  Total pulses delivered: 1800  Tx Loc: F3  Energy: 49% (90% MT)  Number of  Cycles: 60 trains    RDLPFC:  Frequency: 50 Hz  Number of pulses:  3                        Number of bursts: 300  Burst frequency: 5 Hz  Cycle time: 64.6 sec  Total pulses delivered: 1800  Tx Loc: F4 (right side)  Energy: 54% (100% MT)  Number of Cycles: 2 trains      Rating Scales:  Date MADRS IDS-SR PHQ-9   10/24/19 28 33 21   11/1/19  34 13             Post-Procedure Diagnosis:  MDD, recurrent, severe 296.33    Scar Altamirano  [facility] Neuromodulation     Plan   - Cont TMS   - ReMT      I did not see the patient during/after treatment but remained available in the clinic during  treatment.    Starr Beth MD  [facility] Neuromodulation

## 2019-11-15 ENCOUNTER — OFFICE VISIT (OUTPATIENT)
Dept: PSYCHIATRY | Facility: CLINIC | Age: 24
End: 2019-11-15
Payer: COMMERCIAL

## 2019-11-15 VITALS — SYSTOLIC BLOOD PRESSURE: 116 MMHG | DIASTOLIC BLOOD PRESSURE: 70 MMHG | HEART RATE: 93 BPM

## 2019-11-15 DIAGNOSIS — F32.2 CURRENT SEVERE EPISODE OF MAJOR DEPRESSIVE DISORDER WITHOUT PSYCHOTIC FEATURES WITHOUT PRIOR EPISODE (H): Primary | ICD-10-CM

## 2019-11-15 ASSESSMENT — PATIENT HEALTH QUESTIONNAIRE - PHQ9: SUM OF ALL RESPONSES TO PHQ QUESTIONS 1-9: 14

## 2019-11-15 NOTE — PROGRESS NOTES
[facility] TMS Program  [address], [address]  [place] [address]  TMS Procedure Note   Hanna Berg MRN# [id number]  Age: 24 year old year old YOB: 1995    Pre-Procedure:  History and Physical: Reviewed in medical record  Consent Signed by: 10/21  On: Hanna Berg    Clinical Narrative:  Patient is tolerating procedure. Patient reports no mood changes.     Indications for TMS:  MDD, recurrent, severe; 4+ medication trials (from 2+ classes) ineffective; Psychotherapy ineffective.     Pre-Procedure Diagnosis:  MDD, recurrent, severe 296.33    Treatment Hx:  Treatment number this series: 13  Total lifetime treatment number: 13    No Known Allergies   /70 (BP Location: Right arm, Patient Position: Sitting, Cuff Size: Adult Regular)   Pulse 93     Pause for the Cause  Right patient:  Yes  Right procedure/correct coil:  Yes; rTMS; cpt 81408; H1 coil.   Earplugs in place:  Yes    Procedure  Patient was seated in procedure chair. Identity and procedure was verified. Ear plugs were placed in ears and patient-specific cap was placed on head and tightened appropriately. Ruler locations were verified. Coil was placed at treatment location and stimulator was set to parameters described below. A test train was delivered and pt tolerated train. Given pt tolerance, 60 treatment trains were delivered. Pt tolerated procedure with some eyebrow and forehead movement.    Motor Threshold Determination  Distance from nasion to inion: 37 cm  Tragus to Tragus: 38 cm  Head Circumference: 57 cm  MT 1: F3 @ 54% on 10/24/2019    F3 location:  Distance along circumference: 6.57 cm  Distance from vertex: 10.23 cm    Stimulation Parameters  LDLPFC  Frequency: 50 Hz  Number of pulses:  3                        Number of bursts: 10  Burst frequency: 5 Hz  Cycle time: 10 sec  Total pulses delivered: 1800  Tx Loc: F3  Energy: 49% (90% MT)  Number of Cycles: 60 trains    RDLPFC:  Frequency: 50 Hz  Number of  pulses:  3                        Number of bursts: 300  Burst frequency: 5 Hz  Cycle time: 64.6 sec  Total pulses delivered: 1800  Tx Loc: F4 (right side)  Energy: 54% (100% MT)  Number of Cycles: 2 trains      Rating Scales:  Date MADRS IDS-SR PHQ-9   10/24/19 28 33 21   11/1/19  34 13   11/15/19  37 14       Post-Procedure Diagnosis:  MDD, recurrent, severe 296.33    Amada [facility] Neuromodulation     Plan   - Cont TMS   - ReMT      I did not see the patient during/after treatment but remained available in the clinic during  treatment.    Dereck Sharif MD, MD, PhD  [facility] Neuromodulation

## 2019-11-18 ENCOUNTER — OFFICE VISIT (OUTPATIENT)
Dept: PSYCHIATRY | Facility: CLINIC | Age: 24
End: 2019-11-18
Payer: COMMERCIAL

## 2019-11-18 VITALS — HEART RATE: 80 BPM | SYSTOLIC BLOOD PRESSURE: 129 MMHG | DIASTOLIC BLOOD PRESSURE: 82 MMHG

## 2019-11-18 DIAGNOSIS — F33.2 SEVERE EPISODE OF RECURRENT MAJOR DEPRESSIVE DISORDER, WITHOUT PSYCHOTIC FEATURES (H): Primary | ICD-10-CM

## 2019-11-18 ASSESSMENT — PATIENT HEALTH QUESTIONNAIRE - PHQ9: SUM OF ALL RESPONSES TO PHQ QUESTIONS 1-9: 16

## 2019-11-18 NOTE — PROGRESS NOTES
[facility] TMS Program  [address], [address]  [place] [address]  TMS Procedure Note   Hanna Berg MRN# [id number]  Age: 24 year old year old YOB: 1995    Pre-Procedure:  History and Physical: Reviewed in medical record  Consent Signed by: 10/21  On: Hanna Berg    Clinical Narrative:  Patient is tolerating procedure. Patient reports no mood changes overall. She had a good busy weekend.     Indications for TMS:  MDD, recurrent, severe; 4+ medication trials (from 2+ classes) ineffective; Psychotherapy ineffective.     Pre-Procedure Diagnosis:  MDD, recurrent, severe 296.33    Treatment Hx:  Treatment number this series: 14  Total lifetime treatment number: 14    No Known Allergies   /82 (BP Location: Right arm, Patient Position: Sitting, Cuff Size: Adult Regular)   Pulse 80     Pause for the Cause  Right patient:  Yes  Right procedure/correct coil:  Yes; rTMS; cpt 57929; H1 coil.   Earplugs in place:  Yes    Procedure  Patient was seated in procedure chair. Identity and procedure was verified. Ear plugs were placed in ears and patient-specific cap was placed on head and tightened appropriately. Ruler locations were verified. Coil was placed at treatment location and stimulator was set to parameters described below. A test train was delivered and pt tolerated train. Given pt tolerance, 60 treatment trains were delivered. Pt tolerated procedure with some eyebrow and forehead movement.    Motor Threshold Determination  Distance from nasion to inion: 37 cm  Tragus to Tragus: 38 cm  Head Circumference: 57 cm  MT 1: F3 @ 54% on 10/24/2019    F3 location:  Distance along circumference: 6.57 cm  Distance from vertex: 10.23 cm    Stimulation Parameters  LDLPFC  Frequency: 50 Hz  Number of pulses:  3                        Number of bursts: 10  Burst frequency: 5 Hz  Cycle time: 10 sec  Total pulses delivered: 1800  Tx Loc: F3  Energy: 46% (85% MT)  Number of Cycles: 60  trains    RDLPFC:  Frequency: 50 Hz  Number of pulses:  3                        Number of bursts: 300  Burst frequency: 5 Hz  Cycle time: 64.6 sec  Total pulses delivered: 1800  Tx Loc: F4 (right side)  Energy: 46% (85% MT)  Number of Cycles: 2 trains      Rating Scales:  Date MADRS IDS-SR PHQ-9   10/24/19 28 33 21   11/1/19  34 13   11/15/19  37 14       Post-Procedure Diagnosis:  MDD, recurrent, severe 296.33    Scar Altamirano  [facility]   [facility] Neuromodulation     Plan   - Cont TMS   - ReMT      I did not see patient but remained available at the clinic throughout the TMS session    Joann Sauceda MD  [facility] Neuromodulation

## 2019-11-19 ENCOUNTER — OFFICE VISIT (OUTPATIENT)
Dept: PSYCHIATRY | Facility: CLINIC | Age: 24
End: 2019-11-19
Payer: COMMERCIAL

## 2019-11-19 VITALS — HEART RATE: 87 BPM | SYSTOLIC BLOOD PRESSURE: 148 MMHG | DIASTOLIC BLOOD PRESSURE: 86 MMHG

## 2019-11-19 DIAGNOSIS — F33.2 SEVERE RECURRENT MAJOR DEPRESSION WITHOUT PSYCHOTIC FEATURES (H): ICD-10-CM

## 2019-11-19 DIAGNOSIS — F32.2 CURRENT SEVERE EPISODE OF MAJOR DEPRESSIVE DISORDER WITHOUT PSYCHOTIC FEATURES WITHOUT PRIOR EPISODE (H): Primary | ICD-10-CM

## 2019-11-19 ASSESSMENT — PATIENT HEALTH QUESTIONNAIRE - PHQ9: SUM OF ALL RESPONSES TO PHQ QUESTIONS 1-9: 17

## 2019-11-19 NOTE — PROGRESS NOTES
[facility] TMS Program  [address], [address]  [place] [address]  TMS Procedure Note   Hanna Berg MRN# [id number]  Age: 24 year old year old YOB: 1995    Pre-Procedure:  History and Physical: Reviewed in medical record  Consent Signed by: 10/21  On: Hanna Berg    Clinical Narrative:  Patient is tolerating procedure. Patient reports no mood changes overall.     Indications for TMS:  MDD, recurrent, severe; 4+ medication trials (from 2+ classes) ineffective; Psychotherapy ineffective.     Pre-Procedure Diagnosis:  MDD, recurrent, severe 296.33    Treatment Hx:  Treatment number this series: 15  Total lifetime treatment number: 15    No Known Allergies   BP (!) 148/86 (BP Location: Right arm, Patient Position: Sitting, Cuff Size: Adult Regular)   Pulse 87     Pause for the Cause  Right patient:  Yes  Right procedure/correct coil:  Yes; rTMS; cpt 32081; H1 coil.   Earplugs in place:  Yes    Procedure  Patient was seated in procedure chair. Identity and procedure was verified. Ear plugs were placed in ears and patient-specific cap was placed on head and tightened appropriately. Ruler locations were verified. Coil was placed at treatment location and stimulator was set to parameters described below. A test train was delivered and pt tolerated train. Given pt tolerance, 60 treatment trains were delivered. Pt tolerated procedure with some eyebrow and forehead movement.    Motor Threshold Determination  Distance from nasion to inion: 37 cm  Tragus to Tragus: 38 cm  Head Circumference: 57 cm  MT 1: F3 @ 54% on 10/24/2019    F3 location:  Distance along circumference: 6.57 cm  Distance from vertex: 10.23 cm    Stimulation Parameters  LDLPFC  Frequency: 50 Hz  Number of pulses:  3                        Number of bursts: 10  Burst frequency: 5 Hz  Cycle time: 10 sec  Total pulses delivered: 1800  Tx Loc: F3  Energy: 49% (90% MT)  Number of Cycles: 60 trains    RDLPFC:  Frequency: 50  Hz  Number of pulses:  3                        Number of bursts: 300  Burst frequency: 5 Hz  Cycle time: 64.6 sec  Total pulses delivered: 1800  Tx Loc: F4 (right side)  Energy: 49% (90% MT)  Number of Cycles: 2 trains      Rating Scales:  Date MADRS IDS-SR PHQ-9   10/24/19 28 33 21   11/1/19  34 13   11/15/19  37 14       Post-Procedure Diagnosis:  MDD, recurrent, severe 296.33    Amada Reed   [facility]   [facility] Neuromodulation     Plan   - Cont TMS   - ReMT      I did not see the patient during/after treatment but remained available in the clinic during  treatment.    Starr Beth MD  [facility] Neuromodulation

## 2019-11-20 ENCOUNTER — OFFICE VISIT (OUTPATIENT)
Dept: PSYCHIATRY | Facility: CLINIC | Age: 24
End: 2019-11-20
Payer: COMMERCIAL

## 2019-11-20 VITALS — HEART RATE: 81 BPM | SYSTOLIC BLOOD PRESSURE: 155 MMHG | DIASTOLIC BLOOD PRESSURE: 66 MMHG

## 2019-11-20 DIAGNOSIS — F32.2 CURRENT SEVERE EPISODE OF MAJOR DEPRESSIVE DISORDER WITHOUT PSYCHOTIC FEATURES WITHOUT PRIOR EPISODE (H): Primary | ICD-10-CM

## 2019-11-20 ASSESSMENT — PATIENT HEALTH QUESTIONNAIRE - PHQ9: SUM OF ALL RESPONSES TO PHQ QUESTIONS 1-9: 17

## 2019-11-20 NOTE — PROGRESS NOTES
[facility] TMS Program  [address], [address]  [place] [address]  TMS Procedure Note   Hanna Berg MRN# [id number]  Age: 24 year old year old YOB: 1995    Pre-Procedure:  History and Physical: Reviewed in medical record  Consent Signed by: 10/21  On: Hanna Berg    Clinical Narrative:  Patient is tolerating procedure. Patient reports no mood changes overall.  noticed that the patient circled a 3 on question 9 of the PHQ-9 and asked her about it. Patient stated that she was safe and did not have a plan.     Indications for TMS:  MDD, recurrent, severe; 4+ medication trials (from 2+ classes) ineffective; Psychotherapy ineffective.     Pre-Procedure Diagnosis:  MDD, recurrent, severe 296.33    Treatment Hx:  Treatment number this series: 16  Total lifetime treatment number: 16    No Known Allergies   BP (!) 155/66 (BP Location: Right arm, Patient Position: Sitting, Cuff Size: Adult Regular)   Pulse 81     Pause for the Cause  Right patient:  Yes  Right procedure/correct coil:  Yes; rTMS; cpt 40467; H1 coil.   Earplugs in place:  Yes    Procedure  Patient was seated in procedure chair. Identity and procedure was verified. Ear plugs were placed in ears and patient-specific cap was placed on head and tightened appropriately. Ruler locations were verified. Coil was placed at treatment location and stimulator was set to parameters described below. A test train was delivered and pt tolerated train. Given pt tolerance, 60 treatment trains were delivered. Pt tolerated procedure with some eyebrow and forehead movement.    Motor Threshold Determination  Distance from nasion to inion: 37 cm  Tragus to Tragus: 38 cm  Head Circumference: 57 cm  MT 1: F3 @ 54% on 10/24/2019    F3 location:  Distance along circumference: 6.57 cm  Distance from vertex: 10.23 cm    Stimulation Parameters  LDLPFC  Frequency: 50 Hz  Number of pulses:  3                        Number of bursts: 10  Burst  frequency: 5 Hz  Cycle time: 10 sec  Total pulses delivered: 1800  Tx Loc: F3  Energy: 49% (90% MT)  Number of Cycles: 60 trains    RDLPFC:  Frequency: 50 Hz  Number of pulses:  3                        Number of bursts: 300  Burst frequency: 5 Hz  Cycle time: 64.6 sec  Total pulses delivered: 1800  Tx Loc: F4 (right side)  Energy: 51% (95% MT)  Number of Cycles: 2 trains      Rating Scales:  Date MADRS IDS-SR PHQ-9   10/24/19 28 33 21   11/1/19  34 13   11/15/19  37 14       Post-Procedure Diagnosis:  MDD, recurrent, severe 296.33    [facility]   [facility]ental [facility] Neuromodulation     Plan   - Cont TMS   - ReMT    I was available in the clinic throughout the treatment but did not evaluate the patient.  Renetta Castro M.D., Ph.D.     Dept. of Psychiatry   [facility]

## 2019-11-20 NOTE — TELEPHONE ENCOUNTER
-Patient is looking for compounded intranasal ketamine.  Message sent to provider to have them enter script.

## 2019-11-21 ENCOUNTER — OFFICE VISIT (OUTPATIENT)
Dept: PSYCHIATRY | Facility: CLINIC | Age: 24
End: 2019-11-21
Payer: COMMERCIAL

## 2019-11-21 ENCOUNTER — TELEPHONE (OUTPATIENT)
Dept: PSYCHIATRY | Facility: CLINIC | Age: 24
End: 2019-11-21

## 2019-11-21 VITALS — SYSTOLIC BLOOD PRESSURE: 117 MMHG | DIASTOLIC BLOOD PRESSURE: 76 MMHG | HEART RATE: 70 BPM

## 2019-11-21 DIAGNOSIS — F32.2 CURRENT SEVERE EPISODE OF MAJOR DEPRESSIVE DISORDER WITHOUT PSYCHOTIC FEATURES WITHOUT PRIOR EPISODE (H): Primary | ICD-10-CM

## 2019-11-21 ASSESSMENT — PATIENT HEALTH QUESTIONNAIRE - PHQ9: SUM OF ALL RESPONSES TO PHQ QUESTIONS 1-9: 14

## 2019-11-21 NOTE — TELEPHONE ENCOUNTER
What is the concern that needs to be addressed by a nurse? Patient call and state she will pick her ketamine Rx on the 27 th of this month and was told that she will need to come after couple weeks to see  but he does not have any opening until January 6th.Patient was hoping to get seen before that.Please advise.    May a detailed message be left on [employer]? yes    Date of last office visit: 11/14/2019    Message routed to: [facility]

## 2019-11-21 NOTE — PROGRESS NOTES
" [facility] TMS Program  [address], [address]  [place] [address]  TMS Procedure Note   Hanna Berg MRN# [id number]  Age: 24 year old year old YOB: 1995    Pre-Procedure:  History and Physical: Reviewed in medical record  Consent Signed by: 10/21  On: Hanna Berg    Clinical Narrative:  Patient is tolerating procedure. Patient continues to report no change in mood.  Writer assisted patient in scheduling a follow up with Dr. Sauceda and scheduling Ketamine appointments.  Increased energy on RDLPFC to 100% today, patient stated it was the location that hurt not the intensity.  Adjusted coil and patient stated \"I am bored with this, just do the treatment.\"  Writer restarted treatment and patient was tearful during the entire treatment of RDLPC.        Indications for TMS:  MDD, recurrent, severe; 4+ medication trials (from 2+ classes) ineffective; Psychotherapy ineffective.     Pre-Procedure Diagnosis:  MDD, recurrent, severe 296.33    Treatment Hx:  Treatment number this series: 17  Total lifetime treatment number: 17    No Known Allergies   /76   Pulse 70     Pause for the Cause  Right patient:  Yes  Right procedure/correct coil:  Yes; rTMS; cpt 30258; H1 coil.   Earplugs in place:  Yes    Procedure  Patient was seated in procedure chair. Identity and procedure was verified. Ear plugs were placed in ears and patient-specific cap was placed on head and tightened appropriately. Ruler locations were verified. Coil was placed at treatment location and stimulator was set to parameters described below. A test train was delivered and pt tolerated train. Given pt tolerance, 60 treatment trains were delivered. Pt tolerated procedure with some eyebrow and forehead movement.    Motor Threshold Determination  Distance from nasion to inion: 37 cm  Tragus to Tragus: 38 cm  Head Circumference: 57 cm  MT 1: F3 @ 54% on 10/24/2019    F3 location:  Distance along circumference: 6.57 cm  Distance " from vertex: 10.23 cm    Stimulation Parameters  LDLPFC  Frequency: 50 Hz  Number of pulses:  3                        Number of bursts: 10  Burst frequency: 5 Hz  Cycle time: 10 sec  Total pulses delivered: 1800  Tx Loc: F3  Energy: 49% (90% MT)  Number of Cycles: 60 trains    RDLPFC:  Frequency: 50 Hz  Number of pulses:  3                        Number of bursts: 300  Burst frequency: 5 Hz  Cycle time: 64.6 sec  Total pulses delivered: 1800  Tx Loc: F4 (right side)  Energy: 51% (95% MT)  Number of Cycles: 2 trains      Rating Scales:  Date MADRS IDS-SR PHQ-9   10/24/19 28 33 21   11/1/19  34 13   11/15/19  37 14       Post-Procedure Diagnosis:  MDD, recurrent, severe 296.33    Karoline Pastor RN   [facility] Neuromod[facility]     Plan   - Cont TMS   - ReMT      I did not see the patient during/after treatment but remained available in the clinic during  treatment.    Starr Beth MD  [facility] Neuromod[facility]

## 2019-11-22 ENCOUNTER — OFFICE VISIT (OUTPATIENT)
Dept: PSYCHIATRY | Facility: CLINIC | Age: 24
End: 2019-11-22
Payer: COMMERCIAL

## 2019-11-22 VITALS — DIASTOLIC BLOOD PRESSURE: 93 MMHG | SYSTOLIC BLOOD PRESSURE: 150 MMHG | HEART RATE: 96 BPM

## 2019-11-22 DIAGNOSIS — F32.2 CURRENT SEVERE EPISODE OF MAJOR DEPRESSIVE DISORDER WITHOUT PSYCHOTIC FEATURES WITHOUT PRIOR EPISODE (H): Primary | ICD-10-CM

## 2019-11-22 ASSESSMENT — PATIENT HEALTH QUESTIONNAIRE - PHQ9: SUM OF ALL RESPONSES TO PHQ QUESTIONS 1-9: 14

## 2019-11-22 NOTE — PROGRESS NOTES
[facility] TMS Program  [address], [address]  [place] [address]  TMS Procedure Note   Hanna Berg MRN# [id number]  Age: 24 year old year old YOB: 1995    Pre-Procedure:  History and Physical: Reviewed in medical record  Consent Signed by: 10/21  On: Hanna Berg    Clinical Narrative:  Patient is tolerating procedure. Patient continues to report no change in mood. Patient is still trying to tolerate right sided treatment. IDSSR completed today.     Indications for TMS:  MDD, recurrent, severe; 4+ medication trials (from 2+ classes) ineffective; Psychotherapy ineffective.     Pre-Procedure Diagnosis:  MDD, recurrent, severe 296.33    Treatment Hx:  Treatment number this series: 18  Total lifetime treatment number: 18    No Known Allergies   BP (!) 150/93 (BP Location: Right arm, Patient Position: Sitting, Cuff Size: Adult Regular)   Pulse 96     Pause for the Cause  Right patient:  Yes  Right procedure/correct coil:  Yes; rTMS; cpt 86646; H1 coil.   Earplugs in place:  Yes    Procedure  Patient was seated in procedure chair. Identity and procedure was verified. Ear plugs were placed in ears and patient-specific cap was placed on head and tightened appropriately. Ruler locations were verified. Coil was placed at treatment location and stimulator was set to parameters described below. A test train was delivered and pt tolerated train. Given pt tolerance, 60 treatment trains were delivered. Pt tolerated procedure with some eyebrow and forehead movement.    Motor Threshold Determination  Distance from nasion to inion: 37 cm  Tragus to Tragus: 38 cm  Head Circumference: 57 cm  MT 1: F3 @ 54% on 10/24/2019    F3 location:  Distance along circumference: 6.57 cm  Distance from vertex: 10.23 cm    Stimulation Parameters  LDLPFC  Frequency: 50 Hz  Number of pulses:  3                        Number of bursts: 10  Burst frequency: 5 Hz  Cycle time: 10 sec  Total pulses delivered: 1800  Tx Loc:  F3  Energy: 49% (90% MT)  Number of Cycles: 60 trains    RDLPFC:  Frequency: 50 Hz  Number of pulses:  3                        Number of bursts: 300  Burst frequency: 5 Hz  Cycle time: 61.0 sec  Total pulses delivered: 1800  Tx Loc: F4 (right side)  Energy: 51% (95% MT)  Number of Cycles: 2 trains      Rating Scales:  Date MADRS IDS-SR PHQ-9   10/24/19 28 33 21   11/1/19  34 13   11/15/19  37 14   11/22/19  40 14             Post-Procedure Diagnosis:  MDD, recurrent, severe 296.33    Scar Altamirano  [facility] Neuromodulation     Plan   - Cont TMS   - ReMT      I did not see the patient during/after treatment but remained available in the clinic during  treatment.    Dereck Sharif MD, MD, PhD  [facility] Neuromodulation

## 2019-11-25 DIAGNOSIS — F39 MOOD DISORDER (H): ICD-10-CM

## 2019-11-25 RX ORDER — GABAPENTIN 100 MG/1
CAPSULE ORAL
Qty: 90 CAPSULE | Refills: 0 | Status: SHIPPED | OUTPATIENT
Start: 2019-11-25 | End: 2019-12-12

## 2019-11-25 NOTE — TELEPHONE ENCOUNTER
Last seen: 10/18/19  RTC: 3-4 weeks  Cancel: none  No-show: none  Next appt: 12/16/19    Incoming refill from Pharmacy via interface    Medication requested: Gabapentin 100 mg   Directions: Take 1 capsule by mouth three times daily   Qty: 90  Last refilled: 10/24/19    Medication refill approved per refill protocol

## 2019-11-26 ENCOUNTER — OFFICE VISIT (OUTPATIENT)
Dept: PSYCHIATRY | Facility: CLINIC | Age: 24
End: 2019-11-26
Payer: COMMERCIAL

## 2019-11-26 VITALS — SYSTOLIC BLOOD PRESSURE: 133 MMHG | DIASTOLIC BLOOD PRESSURE: 88 MMHG | HEART RATE: 77 BPM

## 2019-11-26 DIAGNOSIS — F32.2 CURRENT SEVERE EPISODE OF MAJOR DEPRESSIVE DISORDER WITHOUT PSYCHOTIC FEATURES WITHOUT PRIOR EPISODE (H): Primary | ICD-10-CM

## 2019-11-26 ASSESSMENT — PATIENT HEALTH QUESTIONNAIRE - PHQ9: SUM OF ALL RESPONSES TO PHQ QUESTIONS 1-9: 17

## 2019-11-26 NOTE — PROGRESS NOTES
[facility] TMS Program  [address], [address]  [place] [address]  TMS Procedure Note   Hanna Berg MRN# [id number]  Age: 24 year old year old YOB: 1995    Pre-Procedure:  History and Physical: Reviewed in medical record  Consent Signed by: 10/21  On: Hanna Berg    Clinical Narrative:  Patient is tolerating procedure. Patient continues to report no change in mood.     Indications for TMS:  MDD, recurrent, severe; 4+ medication trials (from 2+ classes) ineffective; Psychotherapy ineffective.     Pre-Procedure Diagnosis:  MDD, recurrent, severe 296.33    Treatment Hx:  Treatment number this series: 19  Total lifetime treatment number: 19    No Known Allergies   /88 (BP Location: Right arm, Patient Position: Sitting, Cuff Size: Adult Regular)   Pulse 77     Pause for the Cause  Right patient:  Yes  Right procedure/correct coil:  Yes; rTMS; cpt 13311; H1 coil.   Earplugs in place:  Yes    Procedure  Patient was seated in procedure chair. Identity and procedure was verified. Ear plugs were placed in ears and patient-specific cap was placed on head and tightened appropriately. Ruler locations were verified. Coil was placed at treatment location and stimulator was set to parameters described below. A test train was delivered and pt tolerated train. Given pt tolerance, 60 treatment trains were delivered. Pt tolerated procedure with some eyebrow and forehead movement.    Motor Threshold Determination  Distance from nasion to inion: 37 cm  Tragus to Tragus: 38 cm  Head Circumference: 57 cm  MT 1: F3 @ 54% on 10/24/2019    F3 location:  Distance along circumference: 6.57 cm  Distance from vertex: 10.23 cm    Stimulation Parameters  LDLPFC  Frequency: 50 Hz  Number of pulses:  3                        Number of bursts: 10  Burst frequency: 5 Hz  Cycle time: 10 sec  Total pulses delivered: 1800  Tx Loc: F3  Energy: 49% (90% MT)  Number of Cycles: 60 trains    RDLPFC:  Frequency: 50  Hz  Number of pulses:  3                        Number of bursts: 300  Burst frequency: 5 Hz  Cycle time: 61.0 sec  Total pulses delivered: 1800  Tx Loc: F4 (right side)  Energy: 54% (100% MT)  Number of Cycles: 2 trains      Rating Scales:  Date MADRS IDS-SR PHQ-9   10/24/19 28 33 21   11/1/19  34 13   11/15/19  37 14   11/22/19  40 14             Post-Procedure Diagnosis:  MDD, recurrent, severe 296.33    Amada Reed   [facility] Neuromodulation     Plan   - Cont TMS   - ReMT      I did not see the patient during/after treatment but remained available in the clinic during  treatment.    Starr Beth MD  [facility] Neuromodulation

## 2019-12-02 ENCOUNTER — OFFICE VISIT (OUTPATIENT)
Dept: PSYCHIATRY | Facility: CLINIC | Age: 24
End: 2019-12-02
Payer: COMMERCIAL

## 2019-12-02 VITALS — SYSTOLIC BLOOD PRESSURE: 138 MMHG | HEART RATE: 96 BPM | DIASTOLIC BLOOD PRESSURE: 68 MMHG

## 2019-12-02 DIAGNOSIS — F33.2 SEVERE EPISODE OF RECURRENT MAJOR DEPRESSIVE DISORDER, WITHOUT PSYCHOTIC FEATURES (H): Primary | ICD-10-CM

## 2019-12-02 ASSESSMENT — PATIENT HEALTH QUESTIONNAIRE - PHQ9: SUM OF ALL RESPONSES TO PHQ QUESTIONS 1-9: 20

## 2019-12-02 NOTE — PROGRESS NOTES
[facility] TMS Program  [address], [address]  [place] [address]  TMS Procedure Note   Hanna Berg MRN# [id number]  Age: 24 year old year old YOB: 1995    Pre-Procedure:  History and Physical: Reviewed in medical record  Consent Signed by: 10/21  On: Hanna Berg    Clinical Narrative:  Patient is tolerating procedure. Patient stated mood has worsen since last week and hasn't notice improvement. Patient met with Dr. Sherwood and Dr. Sauceda briefly before treatment. Patient was given 1 minute of inhibitory treatment and did not like it. She stated she would not be able to tolerate 50 mins of treatment.     Indications for TMS:  MDD, recurrent, severe; 4+ medication trials (from 2+ classes) ineffective; Psychotherapy ineffective.     Pre-Procedure Diagnosis:  MDD, recurrent, severe 296.33    Treatment Hx:  Treatment number this series: 20  Total lifetime treatment number: 20    No Known Allergies   /68 (BP Location: Right arm, Patient Position: Sitting, Cuff Size: Adult Regular)   Pulse 96     Pause for the Cause  Right patient:  Yes  Right procedure/correct coil:  Yes; rTMS; cpt 52931; H1 coil.   Earplugs in place:  Yes    Procedure  Patient was seated in procedure chair. Identity and procedure was verified. Ear plugs were placed in ears and patient-specific cap was placed on head and tightened appropriately. Ruler locations were verified. Coil was placed at treatment location and stimulator was set to parameters described below. A test train was delivered and pt tolerated train. Given pt tolerance, 60 treatment trains were delivered. Pt tolerated procedure with some eyebrow and forehead movement.    Motor Threshold Determination  Distance from nasion to inion: 37 cm  Tragus to Tragus: 38 cm  Head Circumference: 57 cm  MT 1: F3 @ 54% on 10/24/2019    F3 location:  Distance along circumference: 6.57 cm  Distance from vertex: 10.23 cm    Stimulation Parameters  LDLPFC  Frequency: 50  Hz  Number of pulses:  3                        Number of bursts: 10  Burst frequency: 5 Hz  Cycle time: 10 sec  Total pulses delivered: 1800  Tx Loc: F3  Energy: 49% (90% MT)  Number of Cycles: 60 trains    RDLPFC:  Frequency: 50 Hz  Number of pulses:  3                        Number of bursts: 300  Burst frequency: 5 Hz  Cycle time: 61.0 sec  Total pulses delivered: 1800  Tx Loc: F4 (right side)  Energy: 54% (100% MT)  Number of Cycles: 2 trains      Rating Scales:  Date MADRS IDS-SR PHQ-9   10/24/19 28 33 21   11/1/19  34 13   11/15/19  37 14   11/22/19  40 14             Post-Procedure Diagnosis:  MDD, recurrent, severe 296.33    Sacr Altamirano  [facility] Neuromodulation     Plan   - Cont TMS   - ReMT      I saw patient and discussed limited clinical response. I remained available at the clinical for the remainder of the TMS session    oJann Sauceda MD  [facility] Neuromodulation

## 2019-12-03 ENCOUNTER — OFFICE VISIT (OUTPATIENT)
Dept: PSYCHIATRY | Facility: CLINIC | Age: 24
End: 2019-12-03
Payer: COMMERCIAL

## 2019-12-03 VITALS — SYSTOLIC BLOOD PRESSURE: 143 MMHG | HEART RATE: 86 BPM | DIASTOLIC BLOOD PRESSURE: 74 MMHG

## 2019-12-03 DIAGNOSIS — F32.2 CURRENT SEVERE EPISODE OF MAJOR DEPRESSIVE DISORDER WITHOUT PSYCHOTIC FEATURES WITHOUT PRIOR EPISODE (H): Primary | ICD-10-CM

## 2019-12-03 ASSESSMENT — PATIENT HEALTH QUESTIONNAIRE - PHQ9: SUM OF ALL RESPONSES TO PHQ QUESTIONS 1-9: 21

## 2019-12-03 NOTE — PROGRESS NOTES
[facility] TMS Program  [address], [address]  [place] [address]  TMS Procedure Note   Hanna Berg MRN# [id number]  Age: 24 year old year old YOB: 1995    Pre-Procedure:  History and Physical: Reviewed in medical record  Consent Signed by: 10/21  On: Hanna Berg    Clinical Narrative:  Patient is tolerating procedure. Patient states that her mood has been worse since starting TMS.      Indications for TMS:  MDD, recurrent, severe; 4+ medication trials (from 2+ classes) ineffective; Psychotherapy ineffective.     Pre-Procedure Diagnosis:  MDD, recurrent, severe 296.33    Treatment Hx:  Treatment number this series: 21  Total lifetime treatment number: 21    No Known Allergies   BP (!) 143/74 (BP Location: Right arm, Patient Position: Sitting, Cuff Size: Adult Regular)   Pulse 86     Pause for the Cause  Right patient:  Yes  Right procedure/correct coil:  Yes; rTMS; cpt 51396; H1 coil.   Earplugs in place:  Yes    Procedure  Patient was seated in procedure chair. Identity and procedure was verified. Ear plugs were placed in ears and patient-specific cap was placed on head and tightened appropriately. Ruler locations were verified. Coil was placed at treatment location and stimulator was set to parameters described below. A test train was delivered and pt tolerated train. Given pt tolerance, 60 treatment trains were delivered. Pt tolerated procedure with some eyebrow and forehead movement.    Motor Threshold Determination  Distance from nasion to inion: 37 cm  Tragus to Tragus: 38 cm  Head Circumference: 57 cm  MT 1: F3 @ 54% on 10/24/2019    F3 location:  Distance along circumference: 6.57 cm  Distance from vertex: 10.23 cm    Stimulation Parameters  LDLPFC  Frequency: 50 Hz  Number of pulses:  3                        Number of bursts: 10  Burst frequency: 5 Hz  Cycle time: 10 sec  Total pulses delivered: 1800  Tx Loc: F3  Energy: 49% (90% MT)  Number of Cycles: 60  trains    RDLPFC:  Frequency: 50 Hz  Number of pulses:  3                        Number of bursts: 300  Burst frequency: 5 Hz  Cycle time: 61.0 sec  Total pulses delivered: 1800  Tx Loc: F4 (right side)  Energy: 54% (100% MT)  Number of Cycles: 2 trains      Rating Scales:  Date MADRS IDS-SR PHQ-9   10/24/19 28 33 21   11/1/19  34 13   11/15/19  37 14   11/22/19  40 14             Post-Procedure Diagnosis:  MDD, recurrent, severe 296.33    Amada Brianna  [facility]   [facility]ental [facility] Neuromodulation     Plan   - Cont TMS   - ReMT      I did not see the patient during/after treatment but remained available in the clinic during  treatment.    Starr Beth MD  [facility] Neuromodulation

## 2019-12-04 ENCOUNTER — ALLIED HEALTH/NURSE VISIT (OUTPATIENT)
Dept: PSYCHIATRY | Facility: CLINIC | Age: 24
End: 2019-12-04
Payer: COMMERCIAL

## 2019-12-04 ENCOUNTER — OFFICE VISIT (OUTPATIENT)
Dept: PSYCHIATRY | Facility: CLINIC | Age: 24
End: 2019-12-04
Payer: COMMERCIAL

## 2019-12-04 VITALS — OXYGEN SATURATION: 99 % | DIASTOLIC BLOOD PRESSURE: 62 MMHG | HEART RATE: 91 BPM | SYSTOLIC BLOOD PRESSURE: 103 MMHG

## 2019-12-04 VITALS — HEART RATE: 85 BPM | DIASTOLIC BLOOD PRESSURE: 83 MMHG | SYSTOLIC BLOOD PRESSURE: 129 MMHG

## 2019-12-04 DIAGNOSIS — F32.2 CURRENT SEVERE EPISODE OF MAJOR DEPRESSIVE DISORDER WITHOUT PSYCHOTIC FEATURES WITHOUT PRIOR EPISODE (H): Primary | ICD-10-CM

## 2019-12-04 ASSESSMENT — PATIENT HEALTH QUESTIONNAIRE - PHQ9: SUM OF ALL RESPONSES TO PHQ QUESTIONS 1-9: 19

## 2019-12-04 NOTE — PROGRESS NOTES
[facility] TMS Program  [address], [address]  [place] [address]  TMS Procedure Note   Hanna Berg MRN# [id number]  Age: 24 year old year old YOB: 1995    Pre-Procedure:  History and Physical: Reviewed in medical record  Consent Signed by: 10/21  On: Hanna Berg    Clinical Narrative:  Patient is tolerating procedure. Patient states that her mood has been worse since starting TMS.  Patient cut herself yesterday, she also talked with her therapist about this.     Indications for TMS:  MDD, recurrent, severe; 4+ medication trials (from 2+ classes) ineffective; Psychotherapy ineffective.     Pre-Procedure Diagnosis:  MDD, recurrent, severe 296.33    Treatment Hx:  Treatment number this series: 22  Total lifetime treatment number: 22    No Known Allergies   /83 (BP Location: Right arm, Patient Position: Sitting, Cuff Size: Adult Regular)   Pulse 85     Pause for the Cause  Right patient:  Yes  Right procedure/correct coil:  Yes; rTMS; cpt 09086; H1 coil.   Earplugs in place:  Yes    Procedure  Patient was seated in procedure chair. Identity and procedure was verified. Ear plugs were placed in ears and patient-specific cap was placed on head and tightened appropriately. Ruler locations were verified. Coil was placed at treatment location and stimulator was set to parameters described below. A test train was delivered and pt tolerated train. Given pt tolerance, 60 treatment trains were delivered. Pt tolerated procedure with some eyebrow and forehead movement.    Motor Threshold Determination  Distance from nasion to inion: 37 cm  Tragus to Tragus: 38 cm  Head Circumference: 57 cm  MT 1: F3 @ 54% on 10/24/2019    F3 location:  Distance along circumference: 6.57 cm  Distance from vertex: 10.23 cm    Stimulation Parameters  LDLPFC  Frequency: 50 Hz  Number of pulses:  3                        Number of bursts: 10  Burst frequency: 5 Hz  Cycle time: 10 sec  Total pulses delivered:  1800  Tx Loc: F3  Energy: 49% (90% MT)  Number of Cycles: 60 trains    RDLPFC:  Frequency: 50 Hz  Number of pulses:  3                        Number of bursts: 300  Burst frequency: 5 Hz  Cycle time: 61.0 sec  Total pulses delivered: 1800  Tx Loc: F4 (right side)  Energy: 54% (100% MT)  Number of Cycles: 2 trains      Rating Scales:  Date MADRS IDS-SR PHQ-9   10/24/19 28 33 21   11/1/19  34 13   11/15/19  37 14   11/22/19  40 14             Post-Procedure Diagnosis:  MDD, recurrent, severe 296.33    [facility]   Mental Health Neuromodulation     Plan   - Cont TMS   - ReMT    I was available in the clinic throughout the treatment but did not evaluate the patient.  Renetta Castro M.D., Ph.D.     Dept. of Psychiatry   [facility]

## 2019-12-04 NOTE — NURSING NOTE
Hanna Berg comes into clinic today at the request of Joann Sauceda MD Ordering Provider for Medication Management:  Observation after intransal Ketamine.      Prior to administration pt identified by name and : yes    Appearance: dressed casually   Mood: depressed  Affect: flat  Eye contact: good  Side effects: denies  Level of cooperation: cooperative  Education: none needed  Next appt: this week       Medication provided by Pharmacy        This service provided today was under the supervising provider of the day Renetta Castro MD, who was available if needed.    MANUEL GORMAN RN

## 2019-12-05 ENCOUNTER — ALLIED HEALTH/NURSE VISIT (OUTPATIENT)
Dept: PSYCHIATRY | Facility: CLINIC | Age: 24
End: 2019-12-05
Payer: COMMERCIAL

## 2019-12-05 DIAGNOSIS — F32.2 CURRENT SEVERE EPISODE OF MAJOR DEPRESSIVE DISORDER WITHOUT PSYCHOTIC FEATURES WITHOUT PRIOR EPISODE (H): Primary | ICD-10-CM

## 2019-12-05 ASSESSMENT — PATIENT HEALTH QUESTIONNAIRE - PHQ9: SUM OF ALL RESPONSES TO PHQ QUESTIONS 1-9: 11

## 2019-12-05 NOTE — PROGRESS NOTES
-Writer met with Hanna to check in on TMS.  Discussed that she has had 22 Bilateral treatments with no response and is possibly getting worse.  Hanna agreed that she would like to stop TMS and see how the Ketamine treatment goes.      -She had her first Ketamine treatment yesterday and reports that she has not felt that relaxed in a very long time.  PHQ9 today is an 11 after Ketamine treatment yesterday.  Hanna will have second Ketamine appointment tomorrow and will follow up with Dr. Sauceda on 12/16/19.      -No TMS treatment completed today.

## 2019-12-06 ENCOUNTER — ALLIED HEALTH/NURSE VISIT (OUTPATIENT)
Dept: PSYCHIATRY | Facility: CLINIC | Age: 24
End: 2019-12-06
Payer: COMMERCIAL

## 2019-12-06 VITALS — SYSTOLIC BLOOD PRESSURE: 105 MMHG | OXYGEN SATURATION: 99 % | HEART RATE: 90 BPM | DIASTOLIC BLOOD PRESSURE: 66 MMHG

## 2019-12-06 DIAGNOSIS — F32.2 CURRENT SEVERE EPISODE OF MAJOR DEPRESSIVE DISORDER WITHOUT PSYCHOTIC FEATURES WITHOUT PRIOR EPISODE (H): Primary | ICD-10-CM

## 2019-12-06 NOTE — NURSING NOTE
Hanna Berg comes into clinic today at the request of Joann Sauceda MD Ordering Provider for Medication Management:  observation after intransal Ketamine.      Prior to administration pt identified by name and : yes    Appearance: dressed casually   Mood: depressed, but better  Affect: congruent to mood  Eye contact: good  Side effects: denies  Level of cooperation: cooperative  Education: none needed  Next appt:       Medication provided by Pharmacy         This service provided today was under the supervising provider of the tom Castro MD, who was available if needed.    MANUEL GORMAN, RN

## 2019-12-12 DIAGNOSIS — F39 MOOD DISORDER (H): ICD-10-CM

## 2019-12-12 NOTE — TELEPHONE ENCOUNTER
Last seen: 10/18/19  RTC: 3-4 weeks   Cancel: none  No-show: none  Next appt: 12/16/19    Incoming refill from Pharmacy via Fax    Medication requested: Gabapentin 100 mg   Directions: Take 1 capsule by mouth three times daily   Qty: 90  Last refilled: 11/25/19; patient is requesting a 90 day supply     Writer routed script to provider to approve 90 day supply

## 2019-12-13 RX ORDER — GABAPENTIN 100 MG/1
CAPSULE ORAL
Qty: 270 CAPSULE | Refills: 0 | Status: SHIPPED | OUTPATIENT
Start: 2019-12-13 | End: 2020-08-10 | Stop reason: DRUGHIGH

## 2019-12-16 ENCOUNTER — OFFICE VISIT (OUTPATIENT)
Dept: PSYCHIATRY | Facility: CLINIC | Age: 24
End: 2019-12-16
Payer: COMMERCIAL

## 2019-12-16 VITALS
WEIGHT: 195.6 LBS | BODY MASS INDEX: 29.74 KG/M2 | DIASTOLIC BLOOD PRESSURE: 76 MMHG | SYSTOLIC BLOOD PRESSURE: 125 MMHG | HEART RATE: 93 BPM

## 2019-12-16 DIAGNOSIS — F33.1 MAJOR DEPRESSIVE DISORDER, RECURRENT EPISODE, MODERATE (H): Primary | ICD-10-CM

## 2019-12-16 ASSESSMENT — PAIN SCALES - GENERAL: PAINLEVEL: SEVERE PAIN (6)

## 2019-12-16 ASSESSMENT — PATIENT HEALTH QUESTIONNAIRE - PHQ9: SUM OF ALL RESPONSES TO PHQ QUESTIONS 1-9: 19

## 2019-12-17 ENCOUNTER — TELEPHONE (OUTPATIENT)
Dept: NEUROLOGY | Facility: CLINIC | Age: 24
End: 2019-12-17

## 2019-12-17 DIAGNOSIS — F33.2 SEVERE EPISODE OF RECURRENT MAJOR DEPRESSIVE DISORDER, WITHOUT PSYCHOTIC FEATURES (H): ICD-10-CM

## 2019-12-17 NOTE — TELEPHONE ENCOUNTER
-Received phone call from Hanna to report on how increased dose went last night.  States that she felt her mood lift a bit last night when she did increased dose, but it did not give sustained lift.  States that today she is back down to where she had been before increased dose.  States she did have increased dissociation with increased dose.      -She will need a refill on Ketamine
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
## 2019-12-20 NOTE — TELEPHONE ENCOUNTER
-Patient called to check on status of refill.  Let her know covering provider filled today.  She will call pharmacy to follow up.  States she is going out of town on Sunday and is concerned she will not be able to get it by then.  Encouraged her to call pharmacy and work out a plan with them.  Patient verbalized understanding.

## 2020-01-10 ENCOUNTER — TELEPHONE (OUTPATIENT)
Dept: PSYCHIATRY | Facility: CLINIC | Age: 25
End: 2020-01-10

## 2020-01-10 NOTE — TELEPHONE ENCOUNTER
What is the concern that needs to be addressed by a nurse? Patient was calling to make an appt with  . Patient was suppose to come in 3-4 weeks from her last visit but  does not have anything open till 02/24/2020.Patient was wondering if she can be seen earlier than that?    May a detailed message be left on voicemail? yes    Date of last office visit: 12/16/2019    Message routed to: [facility]

## 2020-01-13 NOTE — TELEPHONE ENCOUNTER
-Writer received call back from patient.  She states that the increase in the intranasal ketamine helped slightly.  Scheduled her to see Dr. Sauceda next week.

## 2020-01-19 DIAGNOSIS — F33.2 SEVERE EPISODE OF RECURRENT MAJOR DEPRESSIVE DISORDER, WITHOUT PSYCHOTIC FEATURES (H): ICD-10-CM

## 2020-01-20 ENCOUNTER — OFFICE VISIT (OUTPATIENT)
Dept: PSYCHIATRY | Facility: CLINIC | Age: 25
End: 2020-01-20
Payer: COMMERCIAL

## 2020-01-20 VITALS
BODY MASS INDEX: 30.2 KG/M2 | DIASTOLIC BLOOD PRESSURE: 75 MMHG | WEIGHT: 198.6 LBS | HEART RATE: 82 BPM | SYSTOLIC BLOOD PRESSURE: 125 MMHG

## 2020-01-20 DIAGNOSIS — F33.2 SEVERE EPISODE OF RECURRENT MAJOR DEPRESSIVE DISORDER, WITHOUT PSYCHOTIC FEATURES (H): Primary | ICD-10-CM

## 2020-01-20 RX ORDER — HEPARIN SODIUM,PORCINE 10 UNIT/ML
5 VIAL (ML) INTRAVENOUS
Status: CANCELLED | OUTPATIENT
Start: 2020-01-22

## 2020-01-20 RX ORDER — HEPARIN SODIUM (PORCINE) LOCK FLUSH IV SOLN 100 UNIT/ML 100 UNIT/ML
5 SOLUTION INTRAVENOUS
Status: CANCELLED | OUTPATIENT
Start: 2020-01-22

## 2020-01-20 RX ORDER — ONDANSETRON 2 MG/ML
4 INJECTION INTRAMUSCULAR; INTRAVENOUS
Status: CANCELLED | OUTPATIENT
Start: 2020-01-22

## 2020-01-20 RX ORDER — HYDRALAZINE HYDROCHLORIDE 20 MG/ML
10 INJECTION INTRAMUSCULAR; INTRAVENOUS
Status: CANCELLED | OUTPATIENT
Start: 2020-01-22

## 2020-01-20 ASSESSMENT — ANXIETY QUESTIONNAIRES
6. BECOMING EASILY ANNOYED OR IRRITABLE: NOT AT ALL
1. FEELING NERVOUS, ANXIOUS, OR ON EDGE: SEVERAL DAYS
5. BEING SO RESTLESS THAT IT IS HARD TO SIT STILL: SEVERAL DAYS
3. WORRYING TOO MUCH ABOUT DIFFERENT THINGS: MORE THAN HALF THE DAYS
GAD7 TOTAL SCORE: 8
2. NOT BEING ABLE TO STOP OR CONTROL WORRYING: MORE THAN HALF THE DAYS
4. TROUBLE RELAXING: MORE THAN HALF THE DAYS
7. FEELING AFRAID AS IF SOMETHING AWFUL MIGHT HAPPEN: NOT AT ALL

## 2020-01-20 ASSESSMENT — PAIN SCALES - GENERAL: PAINLEVEL: NO PAIN (0)

## 2020-01-20 ASSESSMENT — PATIENT HEALTH QUESTIONNAIRE - PHQ9: SUM OF ALL RESPONSES TO PHQ QUESTIONS 1-9: 18

## 2020-01-20 NOTE — Clinical Note
Can you please call infusion center and alert them about this new order. She has new insurance plan so maybe will get it covered.
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
## 2020-01-21 ENCOUNTER — TELEPHONE (OUTPATIENT)
Dept: PSYCHIATRY | Facility: CLINIC | Age: 25
End: 2020-01-21

## 2020-01-21 ASSESSMENT — ANXIETY QUESTIONNAIRES: GAD7 TOTAL SCORE: 8

## 2020-01-21 NOTE — TELEPHONE ENCOUNTER
-Writer called Zita at [facility] to alert of new Ketamine patient.  Also sent message to pool to have auth started.

## 2020-01-31 DIAGNOSIS — F33.2 SEVERE EPISODE OF RECURRENT MAJOR DEPRESSIVE DISORDER, WITHOUT PSYCHOTIC FEATURES (H): ICD-10-CM

## 2020-01-31 NOTE — TELEPHONE ENCOUNTER
-Writer called and spoke with pharmacy.  They received a fax stating that [facility] cannot transfer script so they will need a new one.  Will pend and route to provider for review.

## 2020-01-31 NOTE — TELEPHONE ENCOUNTER
Received call from [facility] in [place], [place]. Patient needs a new rx sent there due to insurance reasons.

## 2020-02-03 NOTE — TELEPHONE ENCOUNTER
-Writer called patient.  She states that even thought new pharmacy is within her insurance plan, they will not cover the med.  She will pay out of pocket for it.

## 2020-02-11 DIAGNOSIS — F33.2 SEVERE RECURRENT MAJOR DEPRESSION WITHOUT PSYCHOTIC FEATURES (H): Primary | ICD-10-CM

## 2020-02-11 NOTE — TELEPHONE ENCOUNTER
-Patient called in to discuss other options.  Let her know that writer will run ketamine injections by Dr. Sauceda to see if this is an option for her.

## 2020-02-11 NOTE — TELEPHONE ENCOUNTER
Zita Velázquez, Karoline Rodas, RN Cc: [facility]             Bry Ramey,     Spoke with Hanna. She says a FT job and is finding the frequency difficult and would mess with her work schedule too much. She's going to giving you/the clinic a call to discuss options based on this difficulty. Would the IM be an option for her? (they are usually shorter appointments than the IV's)     -Zita

## 2020-02-17 NOTE — TELEPHONE ENCOUNTER
-Writer spoke with Dr. Sauceda about switching patient to injections.  He would like to do a one time injection at same dose he wanted for IV to see if patient benefits from the injection more than the intranasal she is using right now.

## 2020-02-24 ENCOUNTER — OFFICE VISIT (OUTPATIENT)
Dept: PSYCHIATRY | Facility: CLINIC | Age: 25
End: 2020-02-24
Payer: COMMERCIAL

## 2020-02-24 ENCOUNTER — TELEPHONE (OUTPATIENT)
Dept: PSYCHIATRY | Facility: CLINIC | Age: 25
End: 2020-02-24

## 2020-02-24 ENCOUNTER — ALLIED HEALTH/NURSE VISIT (OUTPATIENT)
Dept: PSYCHIATRY | Facility: CLINIC | Age: 25
End: 2020-02-24
Payer: COMMERCIAL

## 2020-02-24 VITALS — OXYGEN SATURATION: 100 % | DIASTOLIC BLOOD PRESSURE: 63 MMHG | HEART RATE: 77 BPM | SYSTOLIC BLOOD PRESSURE: 108 MMHG

## 2020-02-24 DIAGNOSIS — F33.2 SEVERE RECURRENT MAJOR DEPRESSION WITHOUT PSYCHOTIC FEATURES (H): Primary | ICD-10-CM
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
## 2020-02-24 NOTE — TELEPHONE ENCOUNTER
-Received VORB from Dr. Sherwood to give Ketamine today 0.5 mg/kg.  Would also like patient to get another injection same does on either Thursday or Friday this week.

## 2020-02-24 NOTE — PROGRESS NOTES
Hanna Berg comes into clinic today at the request of Joann Sauceda/ Eleazar Sherwood MD Ordering Provider for Med Injection only Ketamine.    Medication checked by: Ginny Washburn RN  Prior to administration pt identified by name and : yes    Appearance: dressed casually   Mood: depressed  Affect: flat  Eye contact: good  Side effects: dissociation   Level of cooperation: cooperative  Education: none needed  Next appt: Thursday or Friday       Medication provided by Pharmacy      This service provided today was under the supervising provider of the day Joann Sauceda MD, who was available if needed.    MANUEL GORMAN, RN

## 2020-02-24 NOTE — PATIENT INSTRUCTIONS
- get dose of IM ketamine today. Will also try to get one dose on Thursday (or Friday if schedule necessitates this). Can then check in on Friday via phone

## 2020-02-28 ENCOUNTER — ALLIED HEALTH/NURSE VISIT (OUTPATIENT)
Dept: PSYCHIATRY | Facility: CLINIC | Age: 25
End: 2020-02-28
Payer: COMMERCIAL

## 2020-02-28 ENCOUNTER — TELEPHONE (OUTPATIENT)
Dept: PSYCHIATRY | Facility: CLINIC | Age: 25
End: 2020-02-28

## 2020-02-28 VITALS — HEART RATE: 92 BPM | OXYGEN SATURATION: 100 % | DIASTOLIC BLOOD PRESSURE: 66 MMHG | SYSTOLIC BLOOD PRESSURE: 112 MMHG

## 2020-02-28 DIAGNOSIS — F33.2 SEVERE EPISODE OF RECURRENT MAJOR DEPRESSIVE DISORDER, WITHOUT PSYCHOTIC FEATURES (H): Primary | ICD-10-CM

## 2020-02-28 DIAGNOSIS — F33.2 SEVERE RECURRENT MAJOR DEPRESSION WITHOUT PSYCHOTIC FEATURES (H): Primary | ICD-10-CM

## 2020-02-28 RX ORDER — QUETIAPINE FUMARATE 50 MG/1
TABLET, EXTENDED RELEASE ORAL
Qty: 63 TABLET | Refills: 0 | Status: SHIPPED | OUTPATIENT
Start: 2020-02-28 | End: 2020-04-20

## 2020-02-28 NOTE — PROGRESS NOTES
"Hanna Berg comes into clinic today at the request of Dr. Sauceda Ordering Provider for Med Injection only ketamine.    Medication checked by: Ginny Washburn RN  Prior to administration pt identified by name and : yes    Appearance: neatly dressed, casual   Mood: \"the same\"  Affect: happy  Eye contact: good  Side effects: none  Level of cooperation: cooperative  Education: no needs identified  Next appt: weekly    Medication provided by Pharmacy    This service provided today was under the supervising provider of the day Dr. Sharif, who was available if needed.    Lesley Shields RN    "

## 2020-02-28 NOTE — TELEPHONE ENCOUNTER
Addendum: Spoke w/ patient via phone. She states her IM ketamine treatment on Monday had a stronger immediate effect. However, she was not sure if she felt any different after a couple of hours. She did establish with her new therapist [[facility] ([place]) - [phone]] and signed a EDDIE for to allow us to discuss her case. She has an IM ketamine injection scheduled this afternoon.     Updated Dr. Sauceda regarding patient's status. Discussed plan to contact patient's new therapist to see their thoughts on whether they felt this could be an appropriate time to address Hanna's irritaiblity and past trauma. We also reviewed past medication trials and some future options.   - could try Parnate but would risk orthostasis similar to Nardil  - Has not tried selegiline patch, but this may be less effective than traditional MAOI  - TCAs would be high risk given hx of OD with lithium  - Serzone for anxious depression, but with potential hepatic risk  - SGA like Seroquel for depression and mood stabilization    Discussed recommendation for Seroquel w/ Hanna and reviewed risks/benefits. She reported some hesitation to this medication given history of weight gain on other medications with this risk and history of disordered eating. Discussed trialing a lower dose while monitoring for side effect. We also reviewed some of the other options Dr. Sauceda and I had been weighing. She would prefer to make her decision after her ketamine treatment this afternoon. She confirmed that her mother would be visiting this weekend. We reviewed safety and plan for phone check-in on Monday.    Pt discussed with my attending, Dr. Komal Sherwood MD  PGY-4 Resident

## 2020-03-02 ENCOUNTER — TELEPHONE (OUTPATIENT)
Dept: PSYCHIATRY | Facility: CLINIC | Age: 25
End: 2020-03-02

## 2020-03-02 ENCOUNTER — HEALTH MAINTENANCE LETTER (OUTPATIENT)
Age: 25
End: 2020-03-02

## 2020-03-02 NOTE — TELEPHONE ENCOUNTER
Called patient to check-in. She reports being unsure about her response to her 2nd IM ketamine injection this Friday, partly because she thinks she contracted the flu over the weekend. Her mother did visit. Currently feeling like she can still maintain safety. We reviewed her extensive treatments and discussed whether her non-response to repeated interventions may be a sign that we are focusing on the wrong thing. Revisited her prior diagnosis of BPD, and she acknowledged that one of the primary drivers of her depression is day to day volatility in mood secondary to experiencing conflict in interactions with others. Reviewing her therapy treatment, she states that she did do 1+ year of adherent DBT with her most recent therapist before it morphed into more of a supportive psychotherapy. She did not feel like the therapy was especially helpful. She has met with her new therapist once and will be again this Wednesday. Recommended that she ask her new therapist about whether it would make sense to repeat a formal course of DBT or to potentially look into trauma-oriented therapy. We discussed how medications are primarily used off label as adjunct to therapy for managing some BPD symptoms. As she has already tried numerous antidepressants, lithium, and lamotrigine, something like Seroquel could make sense. However, she remains hesitant about antipsychotics as a class due to weight gain risk. Discussed that I would continue to explore options with the TRD team.    Following phone visit, patient was discussed on the TRD consultation group. There was a consensus that therapy around her trauma would be highly important to her improvement. Medication-wise, Emsam was thought to be an option as it has less risk for orthostasis. Tentative plan is to have patient return to clinic this Friday for a discussion about these recommendations.

## 2020-03-06 ENCOUNTER — OFFICE VISIT (OUTPATIENT)
Dept: PSYCHIATRY | Facility: CLINIC | Age: 25
End: 2020-03-06
Payer: COMMERCIAL

## 2020-03-06 VITALS — SYSTOLIC BLOOD PRESSURE: 108 MMHG | HEART RATE: 80 BPM | DIASTOLIC BLOOD PRESSURE: 67 MMHG

## 2020-03-06 DIAGNOSIS — F33.2 SEVERE EPISODE OF RECURRENT MAJOR DEPRESSIVE DISORDER, WITHOUT PSYCHOTIC FEATURES (H): Primary | ICD-10-CM

## 2020-03-06 RX ORDER — PRAMIPEXOLE DIHYDROCHLORIDE 1 MG/1
1 TABLET ORAL 2 TIMES DAILY
Qty: 60 TABLET | Refills: 1 | Status: SHIPPED | OUTPATIENT
Start: 2020-03-06 | End: 2020-05-15

## 2020-03-06 ASSESSMENT — PATIENT HEALTH QUESTIONNAIRE - PHQ9: SUM OF ALL RESPONSES TO PHQ QUESTIONS 1-9: 20

## 2020-03-06 NOTE — PATIENT INSTRUCTIONS
- we discussed retrying the intranasal ketamine with your other intranasal applicator for one dose; monitor your mood and suicidality on a 1-10 scale every day. if not working well, we can consider going back to IM ketamine at a higher dose    - restart pramipexole: start at 0.25mg at night and increase by 0.25mg every 3 days until you're at 1mg twice daily. We can eventually use larger-sized pills  (I.e. 1mg tablets twice daily)    - call us next week with update of how you're doing    - we can consider adding low-dose naltrexone in about 2 months depending on response to the pramipexole
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
## 2020-03-18 ENCOUNTER — TELEPHONE (OUTPATIENT)
Dept: PSYCHIATRY | Facility: CLINIC | Age: 25
End: 2020-03-18

## 2020-03-18 NOTE — TELEPHONE ENCOUNTER
What is the concern that needs to be addressed by a nurse? Side effects of mirapex she is having    May a detailed message be left on voicemail? yes    Date of last office visit:     Message routed to: Karoline Pastor RN

## 2020-03-18 NOTE — TELEPHONE ENCOUNTER
-Writer returned call to patient.  States that she is having some side effects from the mirapex.      -Reports having nausea and describes a feeling of her throat closing that is relieved if she burps.  States that she started taking it only at night to see if this helped with side effects.      -Current dose is 1 mg at bedtime.  Started noticing the throat issue when she went from 0.75 mg to 1 mg.      -Discussed that nausea is is a common side effect of mirapex.  Informed patient that writer will send message to Dr. Sauceda regarding other side effect

## 2020-03-19 NOTE — TELEPHONE ENCOUNTER
-Writer returned call to Hanna.  Gave her recommendations from Dr. Sherwood.  She verbalized understanding.

## 2020-03-19 NOTE — TELEPHONE ENCOUNTER
Doris Sherwood MD Swanson, Melanie A, RN; Joann Sauceda MD    Caller: Unspecified (Yesterday,  9:24 AM)               I'd recommend slowing down the titration and staying at 1mg for at least a week to see if the sensation improves before making any further increase. If the sensation is getting worse, or if she notices swelling/difficulties breathing, would have her stop it immediately.

## 2020-04-05 DIAGNOSIS — F33.2 SEVERE EPISODE OF RECURRENT MAJOR DEPRESSIVE DISORDER, WITHOUT PSYCHOTIC FEATURES (H): ICD-10-CM

## 2020-04-07 RX ORDER — PRAMIPEXOLE DIHYDROCHLORIDE 1 MG/1
TABLET ORAL
Qty: 60 TABLET | Refills: 1 | OUTPATIENT
Start: 2020-04-07

## 2020-04-07 NOTE — TELEPHONE ENCOUNTER
Medication requested:PRAMIPEXOLE 1 MG TABLET     Last refilled: 3/6/20  Qty: 60/1 rfl      Last seen: 3/6/20  RTC: patient to update clinic about how she is doing on Monday; follow-up in 4 wks   Cancel: 0  No-show: 0  Next appt: 4/20/20    Refill decision:   Denied, too soon for refill

## 2020-04-20 ENCOUNTER — VIRTUAL VISIT (OUTPATIENT)
Dept: PSYCHIATRY | Facility: CLINIC | Age: 25
End: 2020-04-20
Payer: COMMERCIAL

## 2020-04-20 DIAGNOSIS — F33.1 MAJOR DEPRESSIVE DISORDER, RECURRENT EPISODE, MODERATE (H): Primary | ICD-10-CM

## 2020-04-20 NOTE — PROGRESS NOTES
"Hanna Berg is a 25 year old female who is being evaluated via a billable video visit.      The patient has been notified of following:     \"This video visit will be conducted via a call between you and your physician/provider. We have found that certain health care needs can be provided without the need for an in-person physical exam.  This service lets us provide the care you need with a video conversation.  If a prescription is necessary we can send it directly to your pharmacy.  If lab work is needed we can place an order for that and you can then stop by our lab to have the test done at a later time.    Video visits are billed at different rates depending on your insurance coverage.  Please reach out to your insurance provider with any questions.    If during the course of the call the physician/provider feels a video visit is not appropriate, you will not be charged for this service.\"    Patient has given verbal consent for Video visit? Yes    How would you like to obtain your AVS? [place]    Patient would like the video invitation sent by: Send to e-mail at: [email or web address]  {If patient encounters technical issues they should call [phone] :[phone]}    Video Start Time: {video visit start time:152948}    Additional provider notes: {insert note template:[id number]} ***    Thank you  Juan R Antony LPN    Video-Visit Details    Type of service:  Video Visit    Video End Time (time video stopped): ***    Originating Location (pt. Location): {video visit patient location:[id number]::\"Home\"}    Distant Location (provider location):  [facility]     Mode of Communication:  Video Conference via [employer]      {signature options:[id number]}      "
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
## 2020-05-13 DIAGNOSIS — F33.2 SEVERE EPISODE OF RECURRENT MAJOR DEPRESSIVE DISORDER, WITHOUT PSYCHOTIC FEATURES (H): ICD-10-CM

## 2020-05-15 RX ORDER — PRAMIPEXOLE DIHYDROCHLORIDE 1 MG/1
1 TABLET ORAL 2 TIMES DAILY
Qty: 60 TABLET | Refills: 0 | Status: SHIPPED | OUTPATIENT
Start: 2020-05-15 | End: 2020-10-12

## 2020-05-15 NOTE — TELEPHONE ENCOUNTER
Medication requested: Mirapex 1 mg tab  Last refilled: 60  Qty: 4/18/20      Last seen: 4/20/20  RTC: 4 weeks  Cancel: 0  No-show: 0  Next appt: 0    Refill decision:   30 day julio refill sent to the pharmacy - including instructions for patient to call the clinic and schedule an appointment.  Scheduling has been notified to contact the pt for appointment.

## 2020-06-15 ENCOUNTER — VIRTUAL VISIT (OUTPATIENT)
Dept: PSYCHIATRY | Facility: CLINIC | Age: 25
End: 2020-06-15
Payer: COMMERCIAL

## 2020-06-15 DIAGNOSIS — F33.2 SEVERE EPISODE OF RECURRENT MAJOR DEPRESSIVE DISORDER, WITHOUT PSYCHOTIC FEATURES (H): Primary | ICD-10-CM

## 2020-06-15 ASSESSMENT — PATIENT HEALTH QUESTIONNAIRE - PHQ9: SUM OF ALL RESPONSES TO PHQ QUESTIONS 1-9: 18
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
## 2020-06-15 NOTE — PROGRESS NOTES
"Hanna Berg is a 25 year old female who is being evaluated via a billable video visit.      The patient has been notified of following:     \"This video visit will be conducted via a call between you and your physician/provider. We have found that certain health care needs can be provided without the need for an in-person physical exam.  This service lets us provide the care you need with a video conversation.  If a prescription is necessary we can send it directly to your pharmacy.  If lab work is needed we can place an order for that and you can then stop by our lab to have the test done at a later time.    Video visits are billed at different rates depending on your insurance coverage.  Please reach out to your insurance provider with any questions.    If during the course of the call the physician/provider feels a video visit is not appropriate, you will not be charged for this service.\"    Patient has given verbal consent for Video visit? Yes    Will anyone else be joining your video visit? No  {If patient encounters technical issues they should call [phone] :[phone]}      Video-Visit Details    Type of service:  Video Visit    Video Start Time: {video visit start/end time:152948}  Video End Time: {video visit start/end time:152948}    Originating Location (pt. Location): {video visit patient location:922570::\"Home\"}    Distant Location (provider location):  [facility]     Platform used for Video Visit: {Virtual Visit Platforms:371364::\"[place]\"}    {signature options:549916}      "

## 2020-06-19 ENCOUNTER — VIRTUAL VISIT (OUTPATIENT)
Dept: PSYCHIATRY | Facility: CLINIC | Age: 25
End: 2020-06-19
Payer: COMMERCIAL

## 2020-06-19 DIAGNOSIS — F33.2 SEVERE EPISODE OF RECURRENT MAJOR DEPRESSIVE DISORDER, WITHOUT PSYCHOTIC FEATURES (H): Primary | ICD-10-CM

## 2020-06-19 ASSESSMENT — PATIENT HEALTH QUESTIONNAIRE - PHQ9: SUM OF ALL RESPONSES TO PHQ QUESTIONS 1-9: 23

## 2020-06-19 NOTE — PROGRESS NOTES
"Hanna Berg is a 25 year old female who is being evaluated via a billable video visit.      The patient has been notified of following:     \"This video visit will be conducted via a call between you and your physician/provider. We have found that certain health care needs can be provided without the need for an in-person physical exam.  This service lets us provide the care you need with a video conversation.  If a prescription is necessary we can send it directly to your pharmacy.  If lab work is needed we can place an order for that and you can then stop by our lab to have the test done at a later time.    Video visits are billed at different rates depending on your insurance coverage.  Please reach out to your insurance provider with any questions.    If during the course of the call the physician/provider feels a video visit is not appropriate, you will not be charged for this service.\"    Patient has given verbal consent for Video visit? Yes    Will anyone else be joining your video visit? No,send link to email  {If patient encounters technical issues they should call [phone] :[phone]}      Video-Visit Details    Type of service:  Video Visit    Video Start Time: {video visit start/end time:152948}  Video End Time: {video visit start/end time:152948}    Originating Location (pt. Location): {video visit patient location:547036::\"Home\"}    Distant Location (provider location):  [facility]     Platform used for Video Visit: {Virtual Visit Platforms:894554::\"AmWell\"}    {signature options:156467}      "

## 2020-06-19 NOTE — PATIENT INSTRUCTIONS
- start Emsam patch: apply 1 patch to your body every 24 hours. Can take off before bedtime if causing insomnia. Try to change your application sight daily to reduce risk of irritation/rash.    - continue to meet with your therapist
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
## 2020-06-23 DIAGNOSIS — F33.2 SEVERE EPISODE OF RECURRENT MAJOR DEPRESSIVE DISORDER, WITHOUT PSYCHOTIC FEATURES (H): ICD-10-CM

## 2020-06-23 NOTE — TELEPHONE ENCOUNTER
-Attempted to call pharmacy to clarify.  Sat on hold for 20 minutes without getting through.  Will attempt to call again tomorrow.

## 2020-06-24 NOTE — TELEPHONE ENCOUNTER
-Writer called and spoke with Kenzie at [facility].  The packages come in a 30 count and they cannot split the packages.  They will need new scripts for a quantity of 30.      -Writer will resend scripts.

## 2020-06-26 ENCOUNTER — TELEPHONE (OUTPATIENT)
Dept: PSYCHIATRY | Facility: CLINIC | Age: 25
End: 2020-06-26

## 2020-06-26 NOTE — TELEPHONE ENCOUNTER
What is the concern that needs to be addressed by a nurse? Patient would like a jeff back but did not give any other information.    May a detailed message be left on voicemail? yes    Date of last office visit: 04/20/2020    Message routed to: [facility]

## 2020-06-26 NOTE — TELEPHONE ENCOUNTER
-Writer returned call to Hanna.  She was calling for an update on the emsam patches.  Let Hanna know that writer spoke with pharmacy on 6/24/20 and send new scripts to pharmacy for a quantity of 30.  Scripts were also confirmed in our system that they were received by pharmacy.    -She will call pharmacy back to follow up with them.

## 2020-07-07 ENCOUNTER — TELEPHONE (OUTPATIENT)
Dept: PSYCHIATRY | Facility: CLINIC | Age: 25
End: 2020-07-07

## 2020-07-07 NOTE — TELEPHONE ENCOUNTER
What is the concern that needs to be addressed by a nurse? Emsam patch is prohibitively expensive.  Patient would like to discuss alternatives.    May a detailed message be left on voicemail? Yes    Date of last office visit: 6/19/20    Message routed to: Karoline Pastor RN

## 2020-07-07 NOTE — TELEPHONE ENCOUNTER
-Writer returned call to patient.  She reports that the emsam patch is $150 a month.  Patient cannot afford this.        -Patient is wondering if she is able to try Ritalin.  States she used to take this and took an XR version in the morning and the an IR dose twice a day.  She is open to trying another stimulant if Dr. Sauceda feels that would be better.       - Patient is also wondering about something for weight loss, since she will not be starting the emsam.  Reports taking metformin before, but is unsure if this was helpful.      -Writer will route to Dr. Sauceda.

## 2020-07-08 RX ORDER — TOPIRAMATE 100 MG/1
50 TABLET, FILM COATED ORAL 2 TIMES DAILY
Qty: 60 TABLET | Refills: 3 | Status: SHIPPED | OUTPATIENT
Start: 2020-07-08 | End: 2020-07-10

## 2020-07-08 RX ORDER — METHYLPHENIDATE HYDROCHLORIDE 10 MG/1
10 CAPSULE, EXTENDED RELEASE ORAL DAILY
Qty: 30 CAPSULE | Refills: 0 | Status: SHIPPED | OUTPATIENT
Start: 2020-07-08 | End: 2020-10-12

## 2020-07-09 ENCOUNTER — TELEPHONE (OUTPATIENT)
Dept: PSYCHIATRY | Facility: CLINIC | Age: 25
End: 2020-07-09

## 2020-07-09 NOTE — TELEPHONE ENCOUNTER
-Writer called patient and reviewed new instructions with patient.  She verbalized understanding and will  new medications at pharmacy.

## 2020-07-09 NOTE — TELEPHONE ENCOUNTER
Joann Sauceda MD Swanson, Melanie A RN    Caller: Unspecified (2 days ago,  3:38 PM)               I started her on ritalin extended release. I also started her on topiramate 50 mg bid to start with 1 tablet for 1 week. Please let her know that topiramate at 200 mg can interfere with birth control pills and lower their concentration making them less effective. The only real risk with topiramate is developing kidney stones.     How can we get her emsam at a lower price? Did her insurance refuse to cover it?     Thank you

## 2020-07-09 NOTE — TELEPHONE ENCOUNTER
Prior Authorization Retail Medication Request    Medication/Dose: Methylphenidate XR 10 mg   ICD code (if different than what is on RX):  F33.2  Previously Tried and Failed:  See chart  Rationale:  See chart    Insurance Name:  [employer]  Insurance ID:        Pharmacy Information (if different than what is on RX)  [facility]   Phone:  [phone]

## 2020-07-09 NOTE — TELEPHONE ENCOUNTER
-Received message from Sydney stating the Ritalin LA needed a PA.  Writer started one.     -Writer also was informed by Hanna that the topiramate needed clarification.

## 2020-07-10 DIAGNOSIS — F33.2 SEVERE EPISODE OF RECURRENT MAJOR DEPRESSIVE DISORDER, WITHOUT PSYCHOTIC FEATURES (H): ICD-10-CM

## 2020-07-10 RX ORDER — TOPIRAMATE 100 MG/1
TABLET, FILM COATED ORAL
Qty: 60 TABLET | Refills: 3 | Status: SHIPPED | OUTPATIENT
Start: 2020-07-10 | End: 2020-10-12

## 2020-07-10 NOTE — TELEPHONE ENCOUNTER
Cannot reach anyone at the pharmacy as wait is over 10 minutes. Left another voicemail to call back with insurance they are processing in order for us to start the prior auth or call back to let us know if it doesn't need one so we can disregard the request. Current pharmacy coverage insurance info is not in patient chart.

## 2020-07-13 ENCOUNTER — TELEPHONE (OUTPATIENT)
Dept: PSYCHIATRY | Facility: CLINIC | Age: 25
End: 2020-07-13

## 2020-07-13 NOTE — TELEPHONE ENCOUNTER
Prior Authorization Retail Medication Request    Medication/Dose: methylphenidate (RITALIN LA) 10 MG 24 hr capsule   ICD code (if different than what is on RX):    Previously Tried and Failed:    Rationale:      Insurance Name:    Insurance ID:        Pharmacy Information (if different than what is on RX)  Name:    Phone:

## 2020-07-14 NOTE — TELEPHONE ENCOUNTER
This is a duplicate request. Please see telephone encounter dated 07/09/2020 for more details regarding this case.    Thank you,    Central PA Team

## 2020-07-17 NOTE — TELEPHONE ENCOUNTER
Central Prior Authorization Team   Phone: [phone]      PA Initiation    Medication: methylphenidate (RITALIN LA) 10 MG 24 hr capsule   Insurance Company: [employer] Part D - Phone [phone] Fax [phone]  [id number] [place], [place] [facility] [address]  Filling Pharmacy Phone: [phone]  Filling Pharmacy Fax:    Start Date: 7/17/2020

## 2020-07-20 NOTE — TELEPHONE ENCOUNTER
Prior Authorization Approval    Authorization Effective Date: 7/17/2020  Authorization Expiration Date: 7/17/2021  Medication: methylphenidate (RITALIN LA) 10 MG 24 hr capsule- APPROVED  Approved Dose/Quantity:   Reference #:     Insurance Company: [employer] Part D - Phone [phone] Fax [phone]  Expected CoPay:       CoPay Card Available:      Foundation Assistance Needed:    Which Pharmacy is filling the prescription (Not needed for infusion/clinic administered): [id number] [place], [place] - [address] [place] [address]  Pharmacy Notified: Yes-Left message with approval, process, fill, and notify patient when ready  Patient Notified: No

## 2020-07-28 NOTE — TELEPHONE ENCOUNTER
-Writer called patient in check.  Patient states she just started taking the Ritalin after getting the PA approved.  States that she has been taking it for three days and has not noticed anything.      -States she has had some minor weight loss, but has noticed that the topiramate is suppressing her appetite.    -Patient states she does not need anything at this time and will call if she does.

## 2020-08-10 ENCOUNTER — OFFICE VISIT (OUTPATIENT)
Dept: PSYCHIATRY | Facility: CLINIC | Age: 25
End: 2020-08-10
Payer: COMMERCIAL

## 2020-08-10 VITALS
BODY MASS INDEX: 35.31 KG/M2 | HEIGHT: 67 IN | TEMPERATURE: 97.8 F | HEART RATE: 85 BPM | DIASTOLIC BLOOD PRESSURE: 78 MMHG | WEIGHT: 225 LBS | SYSTOLIC BLOOD PRESSURE: 124 MMHG

## 2020-08-10 DIAGNOSIS — F33.2 SEVERE EPISODE OF RECURRENT MAJOR DEPRESSIVE DISORDER, WITHOUT PSYCHOTIC FEATURES (H): ICD-10-CM

## 2020-08-10 DIAGNOSIS — T88.7XXA MEDICATION SIDE EFFECTS: Primary | ICD-10-CM

## 2020-08-10 RX ORDER — GABAPENTIN 300 MG/1
300 CAPSULE ORAL 3 TIMES DAILY PRN
COMMUNITY
End: 2020-10-12

## 2020-08-10 ASSESSMENT — MIFFLIN-ST. JEOR: SCORE: 1803.22

## 2020-08-11 ASSESSMENT — PATIENT HEALTH QUESTIONNAIRE - PHQ9: SUM OF ALL RESPONSES TO PHQ QUESTIONS 1-9: 18

## 2020-10-12 ENCOUNTER — OFFICE VISIT (OUTPATIENT)
Dept: PSYCHIATRY | Facility: CLINIC | Age: 25
End: 2020-10-12
Payer: COMMERCIAL

## 2020-10-12 VITALS — HEART RATE: 100 BPM | DIASTOLIC BLOOD PRESSURE: 78 MMHG | SYSTOLIC BLOOD PRESSURE: 130 MMHG | TEMPERATURE: 97.7 F

## 2020-10-12 DIAGNOSIS — F33.41 MAJOR DEPRESSIVE DISORDER, RECURRENT EPISODE, IN PARTIAL REMISSION (H): Primary | ICD-10-CM

## 2020-10-12 ASSESSMENT — PATIENT HEALTH QUESTIONNAIRE - PHQ9: SUM OF ALL RESPONSES TO PHQ QUESTIONS 1-9: 12
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
## 2020-11-16 ENCOUNTER — TELEPHONE (OUTPATIENT)
Dept: PSYCHIATRY | Facility: CLINIC | Age: 25
End: 2020-11-16

## 2020-11-16 DIAGNOSIS — L70.0 ACNE VULGARIS: ICD-10-CM

## 2020-11-16 NOTE — TELEPHONE ENCOUNTER
What is the concern that needs to be addressed by a nurse? Patient called and had a question adding a medication.    May a detailed message be left on voicemail?yes    Date of last office visit: 10/12/20    Message routed to: Psychiatry RN Pool

## 2020-11-17 NOTE — TELEPHONE ENCOUNTER
Writer returned call to patient.    Patient would like to know if Dr. Sauceda can prescribe/refill her spironolactone.    Writer will route message to provider for determination.

## 2020-11-23 RX ORDER — SPIRONOLACTONE 100 MG/1
100 TABLET, FILM COATED ORAL DAILY
Qty: 90 TABLET | Refills: 0 | Status: SHIPPED | OUTPATIENT
Start: 2020-11-23

## 2020-11-23 NOTE — TELEPHONE ENCOUNTER
Received verbal orders to fill 90 day supply.     Message  Received: Today   Message Contents   Joann Sauceda MD Phiravanh, Joni, MUSTAPHA; Sunni Killian MD   Caller: Unspecified (1 week ago, 4:12 PM)           Let's give her a 90 days supply and have her look for other providers to pick it up      Previous Messages

## 2020-12-20 ENCOUNTER — HEALTH MAINTENANCE LETTER (OUTPATIENT)
Age: 25
End: 2020-12-20

## 2021-01-04 ENCOUNTER — VIRTUAL VISIT (OUTPATIENT)
Dept: PSYCHIATRY | Facility: CLINIC | Age: 26
End: 2021-01-04
Payer: COMMERCIAL

## 2021-01-04 DIAGNOSIS — F33.1 MODERATE EPISODE OF RECURRENT MAJOR DEPRESSIVE DISORDER (H): Primary | ICD-10-CM

## 2021-01-04 RX ORDER — GABAPENTIN 100 MG/1
100 CAPSULE ORAL PRN
COMMUNITY

## 2021-01-04 ASSESSMENT — PATIENT HEALTH QUESTIONNAIRE - PHQ9: SUM OF ALL RESPONSES TO PHQ QUESTIONS 1-9: 10

## 2021-01-04 NOTE — PROGRESS NOTES
Called patient, no answer, left a voicemail message to call back for the rooming process.  Alison Florian, CMA
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
## 2021-01-04 NOTE — PROGRESS NOTES
Hanna Berg is a 25 year old female who is being evaluated via a billable video visit.      How would you like to obtain your AVS? MyChart  If the video visit is dropped, the invitation should be resent by: Send to e-mail at: [email or web address]  Will anyone else be joining your video visit? No      Video Start Time: 4 pm    Video-Visit Details    Type of service:  Video Visit    Video End Time:4:30  pm    Originating Location (pt. Location): Home    Distant Location (provider location):  [facility] PSY[facility]     Platform used for Video Visit: [employer]

## 2021-04-24 ENCOUNTER — HEALTH MAINTENANCE LETTER (OUTPATIENT)
Age: 26
End: 2021-04-24

## 2021-10-03 ENCOUNTER — HEALTH MAINTENANCE LETTER (OUTPATIENT)
Age: 26
End: 2021-10-03

## 2022-05-15 ENCOUNTER — HEALTH MAINTENANCE LETTER (OUTPATIENT)
Age: 27
End: 2022-05-15

## 2022-08-01 NOTE — TELEPHONE ENCOUNTER
Last seen: 5/16/19  RTC: 5 weeks   Cancel: none   No-show: none   Next appt: 7/2/19    Incoming refill from patient via Rx Auth    Medication requested: lamoTRIgine (LAMICTAL) 100 MG tablet   Directions: Take 3 tablets (200 mg) by mouth daily - Oral  Qty: 60  Last refilled: 5/24/19    Post reviewing the chart dose increased on 5/30/19 to Lamictal 300 mg daily. Will route to provider for approval     
Meds refilled by provider   Med tab changed to reflect this     No further action needed by this writer     
IV discontinued, cath removed intact

## 2022-09-10 ENCOUNTER — HEALTH MAINTENANCE LETTER (OUTPATIENT)
Age: 27
End: 2022-09-10

## 2023-06-03 ENCOUNTER — HEALTH MAINTENANCE LETTER (OUTPATIENT)
Age: 28
End: 2023-06-03

## 2023-08-11 ENCOUNTER — EMERGENCY (EMERGENCY)
Facility: HOSPITAL | Age: 28
LOS: 1 days | Discharge: LEFT BEFORE DISCHARGE | End: 2023-08-11
Admitting: EMERGENCY MEDICINE
Payer: COMMERCIAL

## 2023-08-11 VITALS
WEIGHT: 250 LBS | DIASTOLIC BLOOD PRESSURE: 74 MMHG | OXYGEN SATURATION: 96 % | HEART RATE: 89 BPM | RESPIRATION RATE: 16 BRPM | SYSTOLIC BLOOD PRESSURE: 105 MMHG | HEIGHT: 67 IN | TEMPERATURE: 98 F

## 2023-08-11 DIAGNOSIS — Z53.29 PROCEDURE AND TREATMENT NOT CARRIED OUT BECAUSE OF PATIENT'S DECISION FOR OTHER REASONS: ICD-10-CM

## 2023-08-11 DIAGNOSIS — R10.31 RIGHT LOWER QUADRANT PAIN: ICD-10-CM

## 2023-08-11 DIAGNOSIS — R11.0 NAUSEA: ICD-10-CM

## 2023-08-11 DIAGNOSIS — R10.32 LEFT LOWER QUADRANT PAIN: ICD-10-CM

## 2023-08-11 PROCEDURE — 99285 EMERGENCY DEPT VISIT HI MDM: CPT

## 2023-08-11 RX ORDER — SODIUM CHLORIDE 9 MG/ML
2000 INJECTION, SOLUTION INTRAVENOUS ONCE
Refills: 0 | Status: COMPLETED | OUTPATIENT
Start: 2023-08-11 | End: 2023-08-11

## 2023-08-11 RX ORDER — ONDANSETRON 8 MG/1
4 TABLET, FILM COATED ORAL ONCE
Refills: 0 | Status: COMPLETED | OUTPATIENT
Start: 2023-08-11 | End: 2023-08-12

## 2023-08-11 RX ORDER — KETOROLAC TROMETHAMINE 30 MG/ML
15 SYRINGE (ML) INJECTION ONCE
Refills: 0 | Status: DISCONTINUED | OUTPATIENT
Start: 2023-08-11 | End: 2023-08-11

## 2023-08-12 LAB
ALBUMIN SERPL ELPH-MCNC: 3.5 G/DL — SIGNIFICANT CHANGE UP (ref 3.4–5)
ALP SERPL-CCNC: 98 U/L — SIGNIFICANT CHANGE UP (ref 40–120)
ALT FLD-CCNC: 28 U/L — SIGNIFICANT CHANGE UP (ref 12–42)
ANION GAP SERPL CALC-SCNC: 8 MMOL/L — LOW (ref 9–16)
AST SERPL-CCNC: 19 U/L — SIGNIFICANT CHANGE UP (ref 15–37)
BASOPHILS # BLD AUTO: 0.06 K/UL — SIGNIFICANT CHANGE UP (ref 0–0.2)
BASOPHILS NFR BLD AUTO: 0.7 % — SIGNIFICANT CHANGE UP (ref 0–2)
BILIRUB DIRECT SERPL-MCNC: <0.1 MG/DL — SIGNIFICANT CHANGE UP (ref 0–0.3)
BILIRUB INDIRECT FLD-MCNC: >0.1 MG/DL — LOW (ref 0.2–1)
BILIRUB SERPL-MCNC: 0.2 MG/DL — SIGNIFICANT CHANGE UP (ref 0.2–1.2)
BUN SERPL-MCNC: 8 MG/DL — SIGNIFICANT CHANGE UP (ref 7–23)
CALCIUM SERPL-MCNC: 8.5 MG/DL — SIGNIFICANT CHANGE UP (ref 8.5–10.5)
CHLORIDE SERPL-SCNC: 104 MMOL/L — SIGNIFICANT CHANGE UP (ref 96–108)
CO2 SERPL-SCNC: 26 MMOL/L — SIGNIFICANT CHANGE UP (ref 22–31)
CREAT SERPL-MCNC: 0.95 MG/DL — SIGNIFICANT CHANGE UP (ref 0.5–1.3)
EGFR: 84 ML/MIN/1.73M2 — SIGNIFICANT CHANGE UP
EOSINOPHIL # BLD AUTO: 0.11 K/UL — SIGNIFICANT CHANGE UP (ref 0–0.5)
EOSINOPHIL NFR BLD AUTO: 1.2 % — SIGNIFICANT CHANGE UP (ref 0–6)
GLUCOSE SERPL-MCNC: 87 MG/DL — SIGNIFICANT CHANGE UP (ref 70–99)
HCG SERPL-ACNC: <1 MIU/ML — SIGNIFICANT CHANGE UP
HCT VFR BLD CALC: 39.3 % — SIGNIFICANT CHANGE UP (ref 34.5–45)
HGB BLD-MCNC: 12.7 G/DL — SIGNIFICANT CHANGE UP (ref 11.5–15.5)
IMM GRANULOCYTES NFR BLD AUTO: 0.2 % — SIGNIFICANT CHANGE UP (ref 0–0.9)
LIDOCAIN IGE QN: 59 U/L — LOW (ref 73–393)
LYMPHOCYTES # BLD AUTO: 3 K/UL — SIGNIFICANT CHANGE UP (ref 1–3.3)
LYMPHOCYTES # BLD AUTO: 33 % — SIGNIFICANT CHANGE UP (ref 13–44)
MCHC RBC-ENTMCNC: 29.2 PG — SIGNIFICANT CHANGE UP (ref 27–34)
MCHC RBC-ENTMCNC: 32.3 GM/DL — SIGNIFICANT CHANGE UP (ref 32–36)
MCV RBC AUTO: 90.3 FL — SIGNIFICANT CHANGE UP (ref 80–100)
MONOCYTES # BLD AUTO: 0.53 K/UL — SIGNIFICANT CHANGE UP (ref 0–0.9)
MONOCYTES NFR BLD AUTO: 5.8 % — SIGNIFICANT CHANGE UP (ref 2–14)
NEUTROPHILS # BLD AUTO: 5.37 K/UL — SIGNIFICANT CHANGE UP (ref 1.8–7.4)
NEUTROPHILS NFR BLD AUTO: 59.1 % — SIGNIFICANT CHANGE UP (ref 43–77)
NRBC # BLD: 0 /100 WBCS — SIGNIFICANT CHANGE UP (ref 0–0)
PLATELET # BLD AUTO: 259 K/UL — SIGNIFICANT CHANGE UP (ref 150–400)
POTASSIUM SERPL-MCNC: 3.7 MMOL/L — SIGNIFICANT CHANGE UP (ref 3.5–5.3)
POTASSIUM SERPL-SCNC: 3.7 MMOL/L — SIGNIFICANT CHANGE UP (ref 3.5–5.3)
PROT SERPL-MCNC: 7.1 G/DL — SIGNIFICANT CHANGE UP (ref 6.4–8.2)
RBC # BLD: 4.35 M/UL — SIGNIFICANT CHANGE UP (ref 3.8–5.2)
RBC # FLD: 12.8 % — SIGNIFICANT CHANGE UP (ref 10.3–14.5)
SODIUM SERPL-SCNC: 138 MMOL/L — SIGNIFICANT CHANGE UP (ref 132–145)
WBC # BLD: 9.09 K/UL — SIGNIFICANT CHANGE UP (ref 3.8–10.5)
WBC # FLD AUTO: 9.09 K/UL — SIGNIFICANT CHANGE UP (ref 3.8–10.5)

## 2023-08-12 PROCEDURE — 74177 CT ABD & PELVIS W/CONTRAST: CPT | Mod: 26

## 2023-08-12 RX ADMIN — SODIUM CHLORIDE 2000 MILLILITER(S): 9 INJECTION, SOLUTION INTRAVENOUS at 00:00

## 2023-08-12 RX ADMIN — Medication 15 MILLIGRAM(S): at 02:10

## 2023-08-12 RX ADMIN — ONDANSETRON 4 MILLIGRAM(S): 8 TABLET, FILM COATED ORAL at 00:00

## 2023-08-12 RX ADMIN — Medication 15 MILLIGRAM(S): at 00:00

## 2023-08-12 NOTE — ED PROVIDER NOTE - PHYSICAL EXAMINATION
75 Physical Exam    Vital Signs: I have reviewed the initial vital signs.  Constitutional: well-appearing, appears stated age  Eyes: PERRLA, EOM intact, RAPD absent, and symmetrical lids.  ENT: Neck supple with no adenopathy, moist MM.  Cardiovascular: regular rate, regular rhythm, well-perfused extremities  Respiratory: unlabored respiratory effort, clear to auscultation bilaterally  Gastrointestinal: soft, +lower abd ttp, no guarding, no rebound, no cvat b/l  Musculoskeletal: supple neck, no lower extremity edema  Integumentary: warm, dry, no rash  Neurologic: extremities’ motor and sensory functions grossly intact  Psychiatric: A&Ox3

## 2023-08-12 NOTE — ED PROVIDER NOTE - CLINICAL SUMMARY MEDICAL DECISION MAKING FREE TEXT BOX
well appearing pt here with acute onset of lower abd pain with nausea w/o vomiting, with lower abd ttp L=R, no guarding or rebound, no cvat b/l, will r/o diverticulitis, colitis, appendicitis, plan: cbc, cmp, lipase, hcg, ctap

## 2023-08-12 NOTE — ED PROVIDER NOTE - OBJECTIVE STATEMENT
27 yo f no sig pmhx pw acute onset of b/l lower abd pain aching mod in severity non radiating worse with movement w/ nausea ongoing for 3 hr in duration, no vaginal bleeding or discharge, no dysuria, no frequency, no urgency.     I have reviewed available current nursing and previous documentation of past medical, surgical, family, and/or social history.

## 2023-08-12 NOTE — ED ADULT NURSE NOTE - OBJECTIVE STATEMENT
Pt c/o abdominal pain. Pt is alert and oriented x4. No acute distress noted. Vital signs are stable. Labs collected and sent. Medications given as ordered. Safety and fall precautions are maintained. Will continue to provide care.

## 2023-08-12 NOTE — ED PROVIDER NOTE - NS ED ROS FT
Review of Systems    Constitutional: (-) fever  Eyes/ENT: (-) change in vision, (-) sore throat, (-) ear pain  Cardiovascular: (-) chest pain, (-) palpitation   Respiratory: (-) cough, (-) shortness of breath  Gastrointestinal: (-) vomiting, (-) diarrhea  Musculoskeletal: (-) neck pain, (-) back pain, (-) joint pain  Integumentary: (-) rash, (-) edema  Neurological: (-) headache, (-) altered mental status  Heme/Lymph: (-) easy bruising (-) easy bleeding (-) lymphadenopathy  Allergic/Immunologic: (-) pruritus

## 2023-08-12 NOTE — ED PROVIDER NOTE - PROGRESS NOTE DETAILS
IV removed and pt left w/o speaking to provider or receiving the results as per RN pt is requesting to leave the ED, advised to wait for provider  Threatening to leave with IV in place  Pending reevaluation by provider

## 2023-08-12 NOTE — ED PROVIDER NOTE - WET READ LAUNCH FT
BG - I received a call from [facility] this AM on call about an abnormal lab: Glucose of 540. I tried to call the patient but the number on chart was an incorrect number.        Can I defer this to you, Ms. Martinez, to try to contact the patient about this abnormal lab. Rekha Gates M.D.    There are no Wet Read(s) to document.

## 2024-07-19 PROBLEM — Z00.00 ENCOUNTER FOR PREVENTIVE HEALTH EXAMINATION: Status: ACTIVE | Noted: 2024-07-19

## 2024-07-23 ENCOUNTER — APPOINTMENT (OUTPATIENT)
Dept: BURN CARE | Facility: CLINIC | Age: 29
End: 2024-07-23
Payer: COMMERCIAL

## 2024-07-23 ENCOUNTER — TRANSCRIPTION ENCOUNTER (OUTPATIENT)
Age: 29
End: 2024-07-23

## 2024-07-23 PROCEDURE — 16020 DRESS/DEBRID P-THICK BURN S: CPT

## 2024-07-23 NOTE — ASSESSMENT
[FreeTextEntry1] :  Subjective:   - Summary: The patient, Jany Perez, a 29-year-old female, presents with a second-degree hot oil burn to the left periorbital area sustained while cooking at home.   - Chief Complaint (CC): Second-degree hot oil burn to the left periorbital area.   - History of Present Illness: Jany Perez, a 29-year-old female, sustained a second-degree hot oil burn to the left periorbital area while cooking at home. The burn was initially treated with mupirocin ointment and paracetamol for pain relief. The patient reports no other associated symptoms or complaints.   - Past Medical History: The patient has no significant past medical history.   - Past Surgical History: No past surgical history reported.   - Family History:   - Social History:   - Review of Systems: All systems reviewed are negative for any additional complaints or symptoms.   - General: No reported weight changes, fatigue, fever, chills, or night sweats.   - Neurological: No headaches, dizziness, weakness, numbness, or changes in sensation or cognition.   - Musculoskeletal: No joint pain, stiffness, or muscle weakness.   - Cardiovascular: No chest pain, palpitations, or shortness of breath.   - Respiratory: No cough, wheezing, or respiratory distress.   - Gastrointestinal: No abdominal pain, nausea, vomiting, or changes in bowel habits.   - Genitourinary: No urinary complaints or issues.   - Integumentary: No other skin lesions or rashes reported.   - Psychiatric: No changes in mood, sleep, or behavior.   - Medications:   - Allergies: No known drug allergies reported.   Objective:   - Diagnostic Results: No diagnostic tests or imaging studies were performed during this visit.   - Vital Signs: Vital signs were not documented in the provided information.   - Physical Examination (PE): Physical examination revealed a 1% second-degree hot oil burn to the left periorbital area. The burn area had adherent burn tissue and excision debris.   Assessment and Plan:   - 0:     - Second-Degree Hot Oil Burn to Left Periorbital Area: The patient sustained a second-degree hot oil burn to the left periorbital area while cooking at home. The burn area has adherent burn tissue and excision debris, indicating a partial-thickness injury involving the dermis.     - Therapeutic Interventions: Debridement of the burn area with scissors to remove adherent burn tissue and excision debris down to and including the dermis. Cleansing with soap and water. Application of a moisturizing sunblock to promote healing and prevent further injury.      - Diagnostic Tests: No additional diagnostic tests are required at this time.      - Referrals: No referrals are necessary at this point.      - Patient Education: Educate the patient on proper wound care, including keeping the area clean and moisturized, and avoiding sun exposure to prevent hyperpigmentation.      - Follow-Up: Schedule a follow-up appointment in one week to assess the healing progress and provide further treatment as needed.    [Wound Care] : wound care

## 2024-07-23 NOTE — PHYSICAL EXAM
[de-identified] :  Subjective:   - Summary: The patient, Jany Perez, a 29-year-old female, presents with a second-degree hot oil burn to the left periorbital area sustained while cooking at home.   - Chief Complaint (CC): Second-degree hot oil burn to the left periorbital area.   - History of Present Illness: Jany Perez, a 29-year-old female, sustained a second-degree hot oil burn to the left periorbital area while cooking at home. The burn was initially treated with mupirocin ointment and paracetamol for pain relief. The patient reports no other associated symptoms or complaints.   - Past Medical History: The patient has no significant past medical history.   - Past Surgical History: No past surgical history reported.   - Family History:   - Social History:   - Review of Systems: All systems reviewed are negative for any additional complaints or symptoms.   - General: No reported weight changes, fatigue, fever, chills, or night sweats.   - Neurological: No headaches, dizziness, weakness, numbness, or changes in sensation or cognition.   - Musculoskeletal: No joint pain, stiffness, or muscle weakness.   - Cardiovascular: No chest pain, palpitations, or shortness of breath.   - Respiratory: No cough, wheezing, or respiratory distress.   - Gastrointestinal: No abdominal pain, nausea, vomiting, or changes in bowel habits.   - Genitourinary: No urinary complaints or issues.   - Integumentary: No other skin lesions or rashes reported.   - Psychiatric: No changes in mood, sleep, or behavior.   - Medications:   - Allergies: No known drug allergies reported.   Objective:   - Diagnostic Results: No diagnostic tests or imaging studies were performed during this visit.   - Vital Signs: Vital signs were not documented in the provided information.   - Physical Examination (PE): Physical examination revealed a 1% second-degree hot oil burn to the left periorbital area. The burn area had adherent burn tissue and excision debris.   Assessment and Plan:   - 0:     - Second-Degree Hot Oil Burn to Left Periorbital Area: The patient sustained a second-degree hot oil burn to the left periorbital area while cooking at home. The burn area has adherent burn tissue and excision debris, indicating a partial-thickness injury involving the dermis.     - Therapeutic Interventions: Debridement of the burn area with scissors to remove adherent burn tissue and excision debris down to and including the dermis. Cleansing with soap and water. Application of a moisturizing sunblock to promote healing and prevent further injury.      - Diagnostic Tests: No additional diagnostic tests are required at this time.      - Referrals: No referrals are necessary at this point.      - Patient Education: Educate the patient on proper wound care, including keeping the area clean and moisturized, and avoiding sun exposure to prevent hyperpigmentation.      - Follow-Up: Schedule a follow-up appointment in one week to assess the healing progress and provide further treatment as needed.

## 2024-07-30 ENCOUNTER — APPOINTMENT (OUTPATIENT)
Dept: BURN CARE | Facility: CLINIC | Age: 29
End: 2024-07-30

## 2024-08-23 NOTE — TELEPHONE ENCOUNTER
-Writer called back and clarified Ketamine order.  
What is the concern that needs to be addressed by a nurse? Would like a call back - no more information was given    May a detailed message be left on voicemail?     Date of last office visit:     Message routed to: Karoline Pastor    
no

## (undated) RX ORDER — CANDIDA ALBICANS 1000 [PNU]/ML
INJECTION, SOLUTION INTRADERMAL
Status: DISPENSED
Start: 2019-03-12

## (undated) RX ORDER — CANDIDA ALBICANS 1000 [PNU]/ML
INJECTION, SOLUTION INTRADERMAL
Status: DISPENSED
Start: 2019-01-02